# Patient Record
Sex: FEMALE | Race: WHITE | NOT HISPANIC OR LATINO | Employment: OTHER | URBAN - METROPOLITAN AREA
[De-identification: names, ages, dates, MRNs, and addresses within clinical notes are randomized per-mention and may not be internally consistent; named-entity substitution may affect disease eponyms.]

---

## 2019-06-24 ENCOUNTER — TRANSFERRED RECORDS (OUTPATIENT)
Dept: HEALTH INFORMATION MANAGEMENT | Facility: CLINIC | Age: 59
End: 2019-06-24

## 2021-10-01 ENCOUNTER — OFFICE VISIT (OUTPATIENT)
Dept: FAMILY MEDICINE | Facility: OTHER | Age: 61
End: 2021-10-01
Payer: COMMERCIAL

## 2021-10-01 VITALS
SYSTOLIC BLOOD PRESSURE: 124 MMHG | BODY MASS INDEX: 33.56 KG/M2 | HEART RATE: 77 BPM | RESPIRATION RATE: 16 BRPM | WEIGHT: 208.8 LBS | OXYGEN SATURATION: 97 % | TEMPERATURE: 97.6 F | HEIGHT: 66 IN | DIASTOLIC BLOOD PRESSURE: 74 MMHG

## 2021-10-01 DIAGNOSIS — Z98.890 HISTORY OF BACK SURGERY: ICD-10-CM
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
## 2021-10-01 DIAGNOSIS — E66.811 CLASS 1 OBESITY DUE TO EXCESS CALORIES WITHOUT SERIOUS COMORBIDITY WITH BODY MASS INDEX (BMI) OF 33.0 TO 33.9 IN ADULT: ICD-10-CM

## 2021-10-01 DIAGNOSIS — Z00.00 PHYSICAL EXAM, ANNUAL: Primary | ICD-10-CM

## 2021-10-01 DIAGNOSIS — Z00.00 ENCOUNTER FOR MEDICAL EXAMINATION TO ESTABLISH CARE: ICD-10-CM

## 2021-10-01 DIAGNOSIS — K58.1 IRRITABLE BOWEL SYNDROME WITH CONSTIPATION: ICD-10-CM

## 2021-10-01 DIAGNOSIS — E66.09 CLASS 1 OBESITY DUE TO EXCESS CALORIES WITHOUT SERIOUS COMORBIDITY WITH BODY MASS INDEX (BMI) OF 33.0 TO 33.9 IN ADULT: ICD-10-CM

## 2021-10-01 DIAGNOSIS — Z85.3 HX: BREAST CANCER: ICD-10-CM

## 2021-10-01 DIAGNOSIS — N89.8 VAGINAL DRYNESS: ICD-10-CM

## 2021-10-01 DIAGNOSIS — Z23 NEED FOR PROPHYLACTIC VACCINATION AND INOCULATION AGAINST INFLUENZA: ICD-10-CM

## 2021-10-01 DIAGNOSIS — R73.09 ELEVATED GLUCOSE: ICD-10-CM

## 2021-10-01 LAB
ALBUMIN SERPL-MCNC: 4.2 G/DL (ref 3.5–5.7)
ALP SERPL-CCNC: 81 U/L (ref 34–104)
ALT SERPL W P-5'-P-CCNC: 19 U/L (ref 7–52)
ANION GAP SERPL CALCULATED.3IONS-SCNC: 6 MMOL/L (ref 3–14)
AST SERPL W P-5'-P-CCNC: 13 U/L (ref 13–39)
BILIRUB SERPL-MCNC: 0.3 MG/DL (ref 0.3–1)
BUN SERPL-MCNC: 21 MG/DL (ref 7–25)
CALCIUM SERPL-MCNC: 9.4 MG/DL (ref 8.6–10.3)
CANCER AG125 SERPL-ACNC: 16 U/ML (ref 0–35)
CHLORIDE BLD-SCNC: 102 MMOL/L (ref 98–107)
CHOLEST SERPL-MCNC: 210 MG/DL
CO2 SERPL-SCNC: 28 MMOL/L (ref 21–31)
CREAT SERPL-MCNC: 1.06 MG/DL (ref 0.6–1.2)
ERYTHROCYTE [DISTWIDTH] IN BLOOD BY AUTOMATED COUNT: 13.1 % (ref 10–15)
FASTING STATUS PATIENT QL REPORTED: ABNORMAL
GFR SERPL CREATININE-BSD FRML MDRD: 57 ML/MIN/1.73M2
GLUCOSE BLD-MCNC: 144 MG/DL (ref 70–105)
HCT VFR BLD AUTO: 40.1 % (ref 35–47)
HDLC SERPL-MCNC: 43 MG/DL (ref 23–92)
HGB BLD-MCNC: 12.9 G/DL (ref 11.7–15.7)
LDLC SERPL CALC-MCNC: 131 MG/DL
MCH RBC QN AUTO: 28.2 PG (ref 26.5–33)
MCHC RBC AUTO-ENTMCNC: 32.2 G/DL (ref 31.5–36.5)
MCV RBC AUTO: 88 FL (ref 78–100)
NONHDLC SERPL-MCNC: 167 MG/DL
PLATELET # BLD AUTO: 231 10E3/UL (ref 150–450)
POTASSIUM BLD-SCNC: 3.7 MMOL/L (ref 3.5–5.1)
PROT SERPL-MCNC: 6.4 G/DL (ref 6.4–8.9)
RBC # BLD AUTO: 4.58 10E6/UL (ref 3.8–5.2)
SODIUM SERPL-SCNC: 136 MMOL/L (ref 134–144)
TRIGL SERPL-MCNC: 178 MG/DL
TSH SERPL DL<=0.005 MIU/L-ACNC: 3.02 MU/L (ref 0.4–4)
WBC # BLD AUTO: 6.2 10E3/UL (ref 4–11)

## 2021-10-01 PROCEDURE — 90471 IMMUNIZATION ADMIN: CPT | Performed by: FAMILY MEDICINE

## 2021-10-01 PROCEDURE — 90682 RIV4 VACC RECOMBINANT DNA IM: CPT | Performed by: FAMILY MEDICINE

## 2021-10-01 PROCEDURE — 99202 OFFICE O/P NEW SF 15 MIN: CPT | Mod: 25 | Performed by: FAMILY MEDICINE

## 2021-10-01 PROCEDURE — 36415 COLL VENOUS BLD VENIPUNCTURE: CPT | Mod: ZL | Performed by: FAMILY MEDICINE

## 2021-10-01 PROCEDURE — 80053 COMPREHEN METABOLIC PANEL: CPT | Mod: ZL | Performed by: FAMILY MEDICINE

## 2021-10-01 PROCEDURE — 86618 LYME DISEASE ANTIBODY: CPT | Mod: ZL | Performed by: FAMILY MEDICINE

## 2021-10-01 PROCEDURE — 80061 LIPID PANEL: CPT | Mod: ZL | Performed by: FAMILY MEDICINE

## 2021-10-01 PROCEDURE — 99386 PREV VISIT NEW AGE 40-64: CPT | Mod: 25 | Performed by: FAMILY MEDICINE

## 2021-10-01 PROCEDURE — 86753 PROTOZOA ANTIBODY NOS: CPT | Mod: ZL | Performed by: FAMILY MEDICINE

## 2021-10-01 PROCEDURE — 86666 EHRLICHIA ANTIBODY: CPT | Mod: ZL | Performed by: FAMILY MEDICINE

## 2021-10-01 PROCEDURE — 86300 IMMUNOASSAY TUMOR CA 15-3: CPT | Mod: ZL | Performed by: FAMILY MEDICINE

## 2021-10-01 PROCEDURE — 86304 IMMUNOASSAY TUMOR CA 125: CPT | Mod: ZL | Performed by: FAMILY MEDICINE

## 2021-10-01 PROCEDURE — 85027 COMPLETE CBC AUTOMATED: CPT | Mod: ZL | Performed by: FAMILY MEDICINE

## 2021-10-01 PROCEDURE — 84443 ASSAY THYROID STIM HORMONE: CPT | Mod: ZL | Performed by: FAMILY MEDICINE

## 2021-10-01 RX ORDER — AMITRIPTYLINE HYDROCHLORIDE 10 MG/1
TABLET ORAL
Qty: 30 TABLET | Refills: 1 | Status: SHIPPED | OUTPATIENT
Start: 2021-10-01 | End: 2023-04-26

## 2021-10-01 ASSESSMENT — MIFFLIN-ST. JEOR: SCORE: 1524.89

## 2021-10-01 ASSESSMENT — PATIENT HEALTH QUESTIONNAIRE - PHQ9: SUM OF ALL RESPONSES TO PHQ QUESTIONS 1-9: 7

## 2021-10-01 ASSESSMENT — PAIN SCALES - GENERAL: PAINLEVEL: NO PAIN (0)

## 2021-10-01 NOTE — NURSING NOTE
"Chief Complaint   Patient presents with     Physical     Establish Care     Patient is here to establish care and annual physical     Initial /74 (BP Location: Right arm, Patient Position: Sitting, Cuff Size: Adult Large)   Pulse 77   Temp 97.6  F (36.4  C) (Tympanic)   Resp 16   Ht 1.67 m (5' 5.75\")   Wt 94.7 kg (208 lb 12.8 oz)   LMP 08/31/2000   SpO2 97%   Breastfeeding No   BMI 33.96 kg/m   Estimated body mass index is 33.96 kg/m  as calculated from the following:    Height as of this encounter: 1.67 m (5' 5.75\").    Weight as of this encounter: 94.7 kg (208 lb 12.8 oz).  Medication Reconciliation: complete    Sparkle Moreira MA     FOOD SECURITY SCREENING QUESTIONS  Hunger Vital Signs:  Within the past 12 months we worried whether our food would run out before we got money to buy more. Never  Within the past 12 months the food we bought just didn't last and we didn't have money to get more. Never     Sparkle Moreira MA 10/1/2021 1:54 PM    Immunization Documentation    Prior to Immunization administration, verified patients identity using patient's name and date of birth. Please see IMMUNIZATIONS  and order for additional information.  Patient / Parent instructed to remain in clinic for 15 minutes and report any adverse reaction to staff immediately.    Was the entire amount of vaccines given used? Yes    Sparkle Moreira MA  10/1/2021   2:58 PM      "

## 2021-10-01 NOTE — PATIENT INSTRUCTIONS
Get a new, cool, [employer] name.      1.  Metamucil is ok to use every day  2.  Prescription for amitriptyline  3. Prescription for estrogen cream is sent    4.  Labs today
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
## 2021-10-01 NOTE — LETTER
October 2, 2021      Asha Ardon  [address]   [place] [address]        Dear ,    We are writing to inform you of your test results.    These are some of your initial labs to come back.  Some of your cancer markers and tick panel labs are not back at the point of this letter being sent.    Your platelet count is normal.  Thyroid testing is normal.  Blood sugar is elevated - I'm waiting on a test called A1C that will give me an idea of what your blood sugars have been averaging for the past 3 months.    Based on several factors, your current 10 years risk of developing heart disease is:    The 10-year ASCVD risk score (Mimi LAW Jr., et al., 2013) is: 4.2%    Values used to calculate the score:      Age: 61 years      Sex: Female      Is Non- : No      Diabetic: No      Tobacco smoker: No      Systolic Blood Pressure: 124 mmHg      Is BP treated: No      HDL Cholesterol: 43 mg/dL      Total Cholesterol: 210 mg/dL    If your labs show that you have diabetes. That nearly doubles your risk.      Resulted Orders   CBC W PLT No Diff   Result Value Ref Range    WBC Count 6.2 4.0 - 11.0 10e3/uL    RBC Count 4.58 3.80 - 5.20 10e6/uL    Hemoglobin 12.9 11.7 - 15.7 g/dL    Hematocrit 40.1 35.0 - 47.0 %    MCV 88 78 - 100 fL    MCH 28.2 26.5 - 33.0 pg    MCHC 32.2 31.5 - 36.5 g/dL    RDW 13.1 10.0 - 15.0 %    Platelet Count 231 150 - 450 10e3/uL      Result Value Ref Range     16 0 - 35 U/mL   TSH   Result Value Ref Range    TSH 3.02 0.40 - 4.00 mU/L   Comprehensive Metabolic Panel   Result Value Ref Range    Sodium 136 134 - 144 mmol/L    Potassium 3.7 3.5 - 5.1 mmol/L    Chloride 102 98 - 107 mmol/L    Carbon Dioxide (CO2) 28 21 - 31 mmol/L    Anion Gap 6 3 - 14 mmol/L    Urea Nitrogen 21 7 - 25 mg/dL    Creatinine 1.06 0.60 - 1.20 mg/dL    Calcium 9.4 8.6 - 10.3 mg/dL    Glucose 144 (H) 70 - 105 mg/dL    Alkaline Phosphatase 81 34 - 104 U/L    AST 13 13 - 39 U/L    ALT 19 7 - 52 U/L     Protein Total 6.4 6.4 - 8.9 g/dL    Albumin 4.2 3.5 - 5.7 g/dL    Bilirubin Total 0.3 0.3 - 1.0 mg/dL    GFR Estimate 57 (L) >60 mL/min/1.73m2      Comment:      As of July 11, 2021, eGFR is calculated by the CKD-EPI creatinine equation, without race adjustment. eGFR can be influenced by muscle mass, exercise, and diet. The reported eGFR is an estimation only and is only applicable if the renal function is stable.   Lipid Panel   Result Value Ref Range    Cholesterol 210 (H) <200 mg/dL    Triglycerides 178 (H) <150 mg/dL    Direct Measure HDL 43 23 - 92 mg/dL    LDL Cholesterol Calculated 131 (H) <=100 mg/dL    Non HDL Cholesterol 167 (H) <130 mg/dL    Patient Fasting > 8hrs? Unknown     Narrative    Cholesterol  Desirable:  <200 mg/dL    Triglycerides  Normal:  Less than 150 mg/dL  Borderline High:  150-199 mg/dL  High:  200-499 mg/dL  Very High:  Greater than or equal to 500 mg/dL    Direct Measure HDL  Female:  Greater than or equal to 50 mg/dL   Male:  Greater than or equal to 40 mg/dL    LDL Cholesterol  Desirable:  <100mg/dL  Above Desirable:  100-129 mg/dL   Borderline High:  130-159 mg/dL   High:  160-189 mg/dL   Very High:  >= 190 mg/dL    Non HDL Cholesterol  Desirable:  130 mg/dL  Above Desirable:  130-159 mg/dL  Borderline High:  160-189 mg/dL  High:  190-219 mg/dL  Very High:  Greater than or equal to 220 mg/dL       If you have any questions or concerns, please call the clinic at the number listed above.       Sincerely,      Bita Dickey MD

## 2021-10-02 LAB — CANCER AG27-29 SERPL-ACNC: 25 U/ML (ref 0–39)

## 2021-10-03 PROBLEM — K58.1 IRRITABLE BOWEL SYNDROME WITH CONSTIPATION: Status: ACTIVE | Noted: 2021-10-03

## 2021-10-03 PROBLEM — Z98.890 HISTORY OF BACK SURGERY: Status: ACTIVE | Noted: 2021-10-03

## 2021-10-03 PROBLEM — Z85.3 HX: BREAST CANCER: Status: ACTIVE | Noted: 2021-10-03

## 2021-10-04 LAB
B BURGDOR IGG+IGM SER QL: 0.1
B MICROTI IGM TITR SER: NORMAL {TITER}

## 2021-10-05 LAB — A PHAGOCYTOPH IGM TITR SER IF: NORMAL {TITER}

## 2022-06-01 ENCOUNTER — TELEPHONE (OUTPATIENT)
Dept: FAMILY MEDICINE | Facility: OTHER | Age: 62
End: 2022-06-01
Payer: COMMERCIAL

## 2022-06-01 ENCOUNTER — OFFICE VISIT (OUTPATIENT)
Dept: INTERNAL MEDICINE | Facility: OTHER | Age: 62
End: 2022-06-01
Attending: PHYSICIAN ASSISTANT
Payer: COMMERCIAL

## 2022-06-01 VITALS
TEMPERATURE: 98 F | HEIGHT: 66 IN | BODY MASS INDEX: 32.67 KG/M2 | HEART RATE: 71 BPM | WEIGHT: 203.3 LBS | OXYGEN SATURATION: 98 % | DIASTOLIC BLOOD PRESSURE: 74 MMHG | RESPIRATION RATE: 20 BRPM | SYSTOLIC BLOOD PRESSURE: 132 MMHG

## 2022-06-01 DIAGNOSIS — Z91.89 AT HIGH RISK FOR TICK BORNE ILLNESS: ICD-10-CM
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
## 2022-06-01 DIAGNOSIS — S20.461A: Primary | ICD-10-CM

## 2022-06-01 PROCEDURE — 99214 OFFICE O/P EST MOD 30 MIN: CPT | Performed by: INTERNAL MEDICINE

## 2022-06-01 RX ORDER — MULTIPLE VITAMINS W/ MINERALS TAB 9MG-400MCG
1 TAB ORAL
COMMUNITY
End: 2023-04-30

## 2022-06-01 RX ORDER — DOXYCYCLINE 100 MG/1
200 CAPSULE ORAL ONCE
Qty: 40 CAPSULE | Refills: 0 | Status: SHIPPED | OUTPATIENT
Start: 2022-06-01 | End: 2022-06-01

## 2022-06-01 ASSESSMENT — ENCOUNTER SYMPTOMS
CHILLS: 0
SHORTNESS OF BREATH: 0
FEVER: 0
BRUISES/BLEEDS EASILY: 0

## 2022-06-01 NOTE — TELEPHONE ENCOUNTER
The patient had a deer tick on her back and cannot get the head out.  Wondering what to do.   Please advise.

## 2022-06-01 NOTE — PATIENT INSTRUCTIONS
1. Tick bite of right upper back excluding scapular region, initial encounter  2. At high risk for tick borne illness    START:   - doxycycline hyclate (VIBRAMYCIN) 100 MG capsule;     Take 2 capsules (200 mg) by mouth once for 1 dose       -- Take additional 2 capsule x1 dose, after removal of embedded deer tick as needed      Dispense: 40 capsule; Refill: 0      Return as needed for follow-up for new / worsening symptoms.    Clinic : [phone]  Appointment line: [phone]

## 2022-06-01 NOTE — NURSING NOTE
"No chief complaint on file.        Initial /74 (BP Location: Right arm, Patient Position: Sitting, Cuff Size: Adult Large)   Pulse 71   Temp 98  F (36.7  C) (Temporal)   Resp 20   Ht 1.67 m (5' 5.75\")   Wt 92.2 kg (203 lb 4.8 oz)   LMP 08/31/2000   SpO2 98%   Breastfeeding No   BMI 33.06 kg/m   Estimated body mass index is 33.06 kg/m  as calculated from the following:    Height as of this encounter: 1.67 m (5' 5.75\").    Weight as of this encounter: 92.2 kg (203 lb 4.8 oz).       FOOD SECURITY SCREENING QUESTIONS:    The next two questions are to help us understand your food security.  If you are feeling you need any assistance in this area, we have resources available to support you today.    Hunger Vital Signs:  Within the past 12 months we worried whether our food would run out before we got money to buy more. Never  Within the past 12 months the food we bought just didn't last and we didn't have money to get more. Never    Advance Care Directive on file? no      Medication reconciliation complete.      Farooq Pearce,on 6/1/2022 at 3:30 PM          "

## 2022-06-01 NOTE — PROGRESS NOTES
Assessment & Plan       ICD-10-CM    1. Tick bite of right upper back excluding scapular region, initial encounter  S20.461A doxycycline hyclate (VIBRAMYCIN) 100 MG capsule    W57.XXXA    2. At high risk for tick borne illness  Z91.89 doxycycline hyclate (VIBRAMYCIN) 100 MG capsule     Patient presents for evaluation.  Patient had a tick bite on her right upper back that was started for 24+ hours.  Small deer tick was removed.  States that the tick was embedded.  Part of the tick still remains in skin.  No signs of cellulitis.  She just had the tick removed and was concerned and came in for evaluation.  She was not certain what to do for treatment options.  Has had numerous ticks on her as well as her animals recently.  Has been a bad year so far.    Treatment options reviewed discussed.  Recommend doxycycline 2 capsules x 1 dose now.  Additional capsules of the pharmacy to be used as needed after removal of an additional due to any additional embedded deer tick in the future.    Return for Return as needed for new or worsening symptoms, Appointments: [phone].    Domenic Villarreal MD  [facility] AND [facility]    Halley Barry is a 61 year old who presents for the following health issues Tick Bite    History of Present Illness       Reason for visit:  Tick bite  Symptom onset:  1-3 days ago  Symptoms include:  Inbedded tick head. Soreness at tick bite site.  Symptom intensity:  Mild  Symptom progression:  Staying the same  Had these symptoms before:  No  What makes it worse:  No  What makes it better:  No    She eats 2-3 servings of fruits and vegetables daily.She consumes 1 sweetened beverage(s) daily.She exercises with enough effort to increase her heart rate 10 to 19 minutes per day.  She exercises with enough effort to increase her heart rate 5 days per week.   She is taking medications regularly.     Review of Systems   Constitutional: Negative for chills and fever.   Respiratory: Negative for  "shortness of breath.    Cardiovascular: Negative for chest pain.   Skin:        Tick bite on right upper back   Hematological: Does not bruise/bleed easily.          Objective    /74 (BP Location: Right arm, Patient Position: Sitting, Cuff Size: Adult Large)   Pulse 71   Temp 98  F (36.7  C) (Temporal)   Resp 20   Ht 1.67 m (5' 5.75\")   Wt 92.2 kg (203 lb 4.8 oz)   LMP 08/31/2000   SpO2 98%   Breastfeeding No   BMI 33.06 kg/m    Body mass index is 33.06 kg/m .  Physical Exam  Eyes:      General: No scleral icterus.     Conjunctiva/sclera: Conjunctivae normal.   Cardiovascular:      Rate and Rhythm: Normal rate.   Pulmonary:      Effort: Pulmonary effort is normal.   Skin:     Findings: Lesion present. No rash.      Comments: Tick bite with small reddened area on right upper back   Neurological:      Mental Status: She is alert. Mental status is at baseline.                "

## 2022-06-01 NOTE — TELEPHONE ENCOUNTER
Patient call returned, verification of name and . Advised patient to seek rapid clinic today for evaluation given status of tick may be still embedded in skin, no main clinic appointment available this week. Patient verbalized understanding, has no current symptoms of concern. Valerie Conner RN ....................  2022   11:44 AM

## 2022-08-15 ENCOUNTER — VIRTUAL VISIT (OUTPATIENT)
Dept: FAMILY MEDICINE | Facility: OTHER | Age: 62
End: 2022-08-15
Attending: FAMILY MEDICINE
Payer: COMMERCIAL

## 2022-08-15 DIAGNOSIS — U07.1 INFECTION DUE TO 2019 NOVEL CORONAVIRUS: Primary | ICD-10-CM

## 2022-08-15 PROCEDURE — 99212 OFFICE O/P EST SF 10 MIN: CPT | Mod: 95 | Performed by: PHYSICIAN ASSISTANT

## 2022-08-15 ASSESSMENT — PAIN SCALES - GENERAL: PAINLEVEL: MILD PAIN (3)

## 2022-08-15 NOTE — NURSING NOTE
Pt presents to clinic today for antivirals. Patient tested positive today 8/15/22, and had symptoms 8/14/22      FOOD SECURITY SCREENING QUESTIONS:    The next two questions are to help us understand your food security.  If you are feeling you need any assistance in this area, we have resources available to support you today.    Hunger Vital Signs:  Within the past 12 months we worried whether our food would run out before we got money to buy more. Never  Within the past 12 months the food we bought just didn't last and we didn't have money to get more. Never          Medication Reconciliation: complete  Madelyn Perez LPN,LPN on 8/15/2022 at 1:40 PM

## 2022-08-15 NOTE — PATIENT INSTRUCTIONS

## 2022-08-15 NOTE — PROGRESS NOTES
"Asha is a 62 year old who is being evaluated via a billable telephone visit.      What phone number would you like to be contacted at? [phone]  How would you like to obtain your AVS? Mail a copy    Assessment & Plan   Problem List Items Addressed This Visit    None     Visit Diagnoses     Infection due to 2019 novel coronavirus    -  Primary    Relevant Medications    nirmatrelvir and ritonavir (PAXLOVID) therapy pack         Patient was given Paxlovid for treatment of COVID-19.  Gave patient education.  Encouraged increased fluids with electrolytes and rest.  Can use over-the-counter cough and cold remedies as needed for symptomatic relief.  Gave warning signs and symptoms.     BMI:   Estimated body mass index is 33.06 kg/m  as calculated from the following:    Height as of 6/1/22: 1.67 m (5' 5.75\").    Weight as of 6/1/22: 92.2 kg (203 lb 4.8 oz).       See Patient Instructions    Return if symptoms worsen or fail to improve.    Sandra Mendoza PA-C  [facility]    Subjective   Asha is a 62 year old, presenting for the following health issues:  Covid Concern (antiviral)      HPI       COVID-19 Symptom Review  How many days ago did these symptoms start? Yesterday     Are any of the following symptoms significant for you?    New or worsening difficulty breathing? No    Worsening cough? Yes, I am coughing up mucus.    Fever or chills? Yes, I felt feverish or had chills.    Headache: YES    Sore throat: YES- yesterday     Chest pain: No    Diarrhea: No    Body aches? YES    What treatments has patient tried? Ibuprofen    Does patient live in a nursing home, group home, or shelter? No  Does patient have a way to get food/medications during quarantined? Yes, I have a friend or family member who can help me.    Patient woke up yesterday with a sore throat and dry cough.  Started feeling lethargic.  Having sweats and cold feelings.  Feels hot and cold.  Having sweats, body aches, and muscle " cramps.  Unsure if she is having a fever as her thermometer may not be working.  Having some cough and decreased sense of taste and smell.  Feels stuffed up.  Positive for COVID-19 this morning by testing.  Slight headache.  Using ibuprofen as needed.  Keeping food and fluids down.  No dehydration concerns.  No shortness of breath or wheezing.      Review of Systems   Constitutional, HEENT, cardiovascular, pulmonary, gi and gu systems are negative, except as otherwise noted.      Objective    Vitals - Patient Reported  Pain Score: Mild Pain (3)  Pain Loc:  (body aches)      Vitals:  No vitals were obtained today due to virtual visit.    Physical Exam   healthy, alert and no distress  PSYCH: Alert and oriented times 3; coherent speech, normal   rate and volume, able to articulate logical thoughts, able   to abstract reason, no tangential thoughts, no hallucinations   or delusions  Her affect is normal  RESP: No cough, no audible wheezing, able to talk in full sentences  Remainder of exam unable to be completed due to telephone visits                Phone call duration: 9 minutes    .  ..

## 2022-11-19 ENCOUNTER — HEALTH MAINTENANCE LETTER (OUTPATIENT)
Age: 62
End: 2022-11-19

## 2022-12-29 ENCOUNTER — IMMUNIZATION (OUTPATIENT)
Dept: FAMILY MEDICINE | Facility: OTHER | Age: 62
End: 2022-12-29
Attending: FAMILY MEDICINE
Payer: COMMERCIAL

## 2022-12-29 DIAGNOSIS — Z23 NEED FOR PROPHYLACTIC VACCINATION AND INOCULATION AGAINST INFLUENZA: Primary | ICD-10-CM

## 2022-12-29 DIAGNOSIS — Z23 HIGH PRIORITY FOR 2019-NCOV VACCINE: ICD-10-CM

## 2022-12-29 PROCEDURE — 90471 IMMUNIZATION ADMIN: CPT

## 2022-12-29 PROCEDURE — 0124A COVID-19 VACCINE BIVALENT BOOSTER 12+ (PFIZER): CPT

## 2022-12-29 PROCEDURE — 90682 RIV4 VACC RECOMBINANT DNA IM: CPT

## 2022-12-29 PROCEDURE — 91312 COVID-19 VACCINE BIVALENT BOOSTER 12+ (PFIZER): CPT

## 2023-02-07 ENCOUNTER — TRANSFERRED RECORDS (OUTPATIENT)
Dept: HEALTH INFORMATION MANAGEMENT | Facility: OTHER | Age: 63
End: 2023-02-07
Payer: COMMERCIAL

## 2023-02-13 NOTE — PROGRESS NOTES
Assessment & Plan     1. Closed fracture of multiple ribs of right side with routine healing, subsequent encounter  2. Concussion with loss of consciousness of 30 minutes or less, subsequent encounter  3. Traumatic pneumothorax, subsequent encounter  Reviewed outside records, will request to push imaging for further review.  Patient is healing well.  Updated chest x-ray which was stable today.  Continue with symptomatic management and follow-up as needed.    4. Mild coronary artery disease  Noted on CT chest completed in ED during trauma evaluation.  Discussed with patient that calcification was noted to be mild in discharge summary, unable to review radiology report or imaging.  Discussed updating lab work as below to help minimize risk factors.  Results as discussed below. Currently no cardiac symptoms. Will continue to minimize risk factors as discussed below, focus on healthy lifestyle.   - Hemoglobin A1c; Future  - Lipid Panel; Future  - Basic Metabolic Panel; Future  - Hemoglobin A1c  - Lipid Panel  - Basic Metabolic Panel    5. Type 2 diabetes mellitus without complication, without long-term current use of insulin (H)  New diagnosis with A1c at 7.6 today.  Recommend patient schedule follow-up appoint with myself or PCP to discuss treatment options and get started on medication in addition to healthy lifestyle.    6. Mixed hyperlipidemia  Level stable compared to previous, will likely need to start statin due to new diagnosis of type 2 diabetes.  Can discuss this at follow-up appointment with myself or PCP.    7. Class 1 obesity without serious comorbidity with body mass index (BMI) of 33.0 to 33.9 in adult, unspecified obesity type  Patient continues to struggle with weight.  Has tried different diets and exercise with minimal improvement.  She would be interested in meeting with medical weight management team.  Referral placed.  - Comprehensive Weight Management; Future        Return if symptoms worsen or  fail to improve, for Follow up with myself or PCP to discuss new diagnosis of Type II Diabetes.    Beth Flores PA-C  [facility]    Subjective   Asha is a 62 year old, presenting for the following health issues:  Hospital F/U      History of Present Illness       Reason for visit:  Cracked ribs and review CT scan of my heart  Symptom onset:  1-2 weeks ago  Symptoms include:  Pain in my lower right side rib cage.  Symptom intensity:  Moderate  Symptom progression:  Improving  Had these symptoms before:  No  What makes it worse:  Coughing, reaching across body, laying down  What makes it better:  I feel good except for the above mentioned motions    She eats 2-3 servings of fruits and vegetables daily.She consumes 2 sweetened beverage(s) daily.She exercises with enough effort to increase her heart rate 10 to 19 minutes per day.  She exercises with enough effort to increase her heart rate 7 days per week.   She is taking medications regularly.       Hospital Follow-up Visit:    Hospital/Nursing Home/IP Rehab Facility: [facility]  Date of Admission: 02/04/2023  Date of Discharge: 02/07/2023  Reason(s) for Admission: Dog sledding accident- concussion, right pneumothorax, right 6-9 rib fractures, mild coronary artery disease    Was your hospitalization related to COVID-19? No   Problems taking medications regularly:  None  Medication changes since discharge: None  Problems adhering to non-medication therapy:  None    Summary of hospitalization:  See outside records, reviewed and scanned  Diagnostic Tests/Treatments reviewed.  Follow up needed: none  Other Healthcare Providers Involved in Patient s Care:         None  Update since discharge: improved.     Plan of care communicated with patient     Reviewed fax discharge summary showing patient was evaluated for trauma secondary to dog sledding accident.  Diagnosed with concussion with brief loss of consciousness, right pneumothorax initially  seen on CT, resolved by following morning, right 6-9 rib fractures noted on CT, minimal evidence on follow-up x-rays.  Patient improved well throughout her hospital stay.  Incidental finding of mild coronary artery calcification especially in the LAD on CT during trauma evaluation.  Patient has previously been noted to have mild high cholesterol, borderline prediabetes.  Has not had lab work for follow-up of either since 10/2021.  Underwent chest x-ray ordered by discharging provider prior to today's visit which was stable.  Overall patient is feeling quite well.  Still struggling with some posterior right rib pain but managing well symptomatically.    Patient would like to focus on healthy lifestyle, weight loss.  She has tried with several different diets, exercise with minimal improvement.  BMI is currently 33.47.  Due for follow-up lab work.      PAST MEDICAL HISTORY:   Past Medical History:   Diagnosis Date     Back pain 1992    X5 total 1994, 1998. 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       PAST SURGICAL HISTORY:   Past Surgical History:   Procedure Laterality Date     ANESTH,SKIN SURGERY, BACK  1992, 1994, 1998,2004     BREAST BIOPSY, RT/LT  11/10/1998    left breast biopsy     COLONOSCOPY  08/15/2019     HYSTERECTOMY, IMTIAZ  01/01/2000    with BSO     MASTECTOMY, BILATERAL  11/19/1998     ZZC ANESTH,SURG BREAST RECONSTRUCTIVE Bilateral 2004       FAMILY HISTORY:   Family History   Problem Relation Age of Onset     Valvular heart disease Mother         2018     Heart Disease Father      Dementia Father         vascular     Breast Cancer Sister 29     Breast Cancer Sister         DCIS     No Known Problems Brother      No Known Problems Brother        SOCIAL HISTORY:   Social History     Tobacco Use     Smoking status: Never     Smokeless tobacco: Never   Substance Use Topics     Alcohol use: Yes  "    Comment: socially - couple drinks every 3 months        Allergies   Allergen Reactions     Ancef [Cefazolin Sodium] Rash     Biaxin [Clarithromycin] Rash     Contrast Dye Rash     Current Outpatient Medications   Medication     amitriptyline (ELAVIL) 10 MG tablet     multivitamin w/minerals (THERA-VIT-M) tablet     conjugated estrogens (PREMARIN) 0.625 MG/GM vaginal cream     No current facility-administered medications for this visit.         Review of Systems   Per HPI        Objective    /76   Pulse 78   Temp (!) 96.3  F (35.7  C)   Resp 14   Ht 1.67 m (5' 5.75\")   Wt 93.4 kg (205 lb 12.8 oz)   LMP 08/31/2000   SpO2 98%   BMI 33.47 kg/m    Body mass index is 33.47 kg/m .  Physical Exam   General: Pleasant, in no apparent distress.  Cardiovascular: Regular rate and rhythm with S1 equal to S2. No murmurs, friction rubs, or gallops.   Respiratory: Lungs are resonant and clear to auscultation bilaterally. No wheezes, crackles, or rhonchi.  Musculoskeletal: Tenderness over posterior mid ribs on right  Psych: Appropriate mood and affect.    Results for orders placed or performed during the hospital encounter of 02/14/23   XR Chest 2 Views     Status: None    Narrative    Procedure:XR CHEST 2 VIEWS    Clinical history:Female, 62 years, Closed fracture of multiple ribs of  right side, initial encounter    Technique: Two views are submitted.    Comparison: No relevant prior imaging.    Findings: The cardiac silhouette is normal. The pulmonary vasculature  is normal.    The lungs are hyperinflated and demonstrate areas of atelectasis in  the lung bases as well as within the right middle lobe. Bony  structures demonstrate no acute abnormality. Postoperative changes are  seen in the cervicothoracic regions. Degenerative changes seen within  the left and right AC joint.      Impression    Impression:   No distinct evidence of rib fracture or pneumothorax.    Hyperinflation of the lungs with areas of " atelectasis in the lung  bases and right middle lobe.     NATHANAEL JORDAN MD         SYSTEM ID:  [id number]   Results for orders placed or performed in visit on 02/14/23   Hemoglobin A1c     Status: Abnormal   Result Value Ref Range    Hemoglobin A1C 7.6 (H) 4.0 - 6.2 %   Lipid Panel     Status: Abnormal   Result Value Ref Range    Cholesterol 207 (H) <200 mg/dL    Triglycerides 133 <150 mg/dL    Direct Measure HDL 50 >=50 mg/dL    LDL Cholesterol Calculated 130 (H) <=100 mg/dL    Non HDL Cholesterol 157 (H) <130 mg/dL    Narrative    Cholesterol  Desirable:  <200 mg/dL    Triglycerides  Normal:  Less than 150 mg/dL  Borderline High:  150-199 mg/dL  High:  200-499 mg/dL  Very High:  Greater than or equal to 500 mg/dL    Direct Measure HDL  Female:  Greater than or equal to 50 mg/dL   Male:  Greater than or equal to 40 mg/dL    LDL Cholesterol  Desirable:  <100mg/dL  Above Desirable:  100-129 mg/dL   Borderline High:  130-159 mg/dL   High:  160-189 mg/dL   Very High:  >= 190 mg/dL    Non HDL Cholesterol  Desirable:  130 mg/dL  Above Desirable:  130-159 mg/dL  Borderline High:  160-189 mg/dL  High:  190-219 mg/dL  Very High:  Greater than or equal to 220 mg/dL   Basic Metabolic Panel     Status: Abnormal   Result Value Ref Range    Sodium 141 136 - 145 mmol/L    Potassium 4.5 3.4 - 5.3 mmol/L    Chloride 103 98 - 107 mmol/L    Carbon Dioxide (CO2) 32 (H) 22 - 29 mmol/L    Anion Gap 6 (L) 7 - 15 mmol/L    Urea Nitrogen 22.1 8.0 - 23.0 mg/dL    Creatinine 0.95 0.51 - 0.95 mg/dL    Calcium 9.0 8.8 - 10.2 mg/dL    Glucose 159 (H) 70 - 99 mg/dL    GFR Estimate 67 >60 mL/min/1.73m2   Extra Tube     Status: None    Narrative    The following orders were created for panel order Extra Tube.  Procedure                               Abnormality         Status                     ---------                               -----------         ------                     Extra Serum Separator Tu...[[id number]]                      Final  result                 Please view results for these tests on the individual orders.   Extra Serum Separator Tube (SST)     Status: None   Result Value Ref Range    Hold Specimen x

## 2023-02-14 ENCOUNTER — OFFICE VISIT (OUTPATIENT)
Dept: FAMILY MEDICINE | Facility: OTHER | Age: 63
End: 2023-02-14
Attending: PHYSICIAN ASSISTANT
Payer: COMMERCIAL

## 2023-02-14 ENCOUNTER — HOSPITAL ENCOUNTER (OUTPATIENT)
Dept: GENERAL RADIOLOGY | Facility: OTHER | Age: 63
Discharge: HOME | End: 2023-02-14
Payer: COMMERCIAL

## 2023-02-14 VITALS
SYSTOLIC BLOOD PRESSURE: 132 MMHG | HEIGHT: 66 IN | RESPIRATION RATE: 14 BRPM | WEIGHT: 205.8 LBS | HEART RATE: 78 BPM | TEMPERATURE: 96.3 F | OXYGEN SATURATION: 98 % | DIASTOLIC BLOOD PRESSURE: 76 MMHG | BODY MASS INDEX: 33.07 KG/M2

## 2023-02-14 DIAGNOSIS — S27.0XXD TRAUMATIC PNEUMOTHORAX, SUBSEQUENT ENCOUNTER: ICD-10-CM

## 2023-02-14 DIAGNOSIS — E66.811 CLASS 1 OBESITY WITHOUT SERIOUS COMORBIDITY WITH BODY MASS INDEX (BMI) OF 33.0 TO 33.9 IN ADULT, UNSPECIFIED OBESITY TYPE: ICD-10-CM

## 2023-02-14 DIAGNOSIS — S22.41XD CLOSED FRACTURE OF MULTIPLE RIBS OF RIGHT SIDE WITH ROUTINE HEALING, SUBSEQUENT ENCOUNTER: Primary | ICD-10-CM

## 2023-02-14 DIAGNOSIS — S06.0X1D CONCUSSION WITH LOSS OF CONSCIOUSNESS OF 30 MINUTES OR LESS, SUBSEQUENT ENCOUNTER: ICD-10-CM

## 2023-02-14 DIAGNOSIS — I25.10 MILD CORONARY ARTERY DISEASE: ICD-10-CM

## 2023-02-14 DIAGNOSIS — E11.9 TYPE 2 DIABETES MELLITUS WITHOUT COMPLICATION, WITHOUT LONG-TERM CURRENT USE OF INSULIN (H): ICD-10-CM

## 2023-02-14 DIAGNOSIS — E78.2 MIXED HYPERLIPIDEMIA: ICD-10-CM

## 2023-02-14 DIAGNOSIS — S22.41XA CLOSED FRACTURE OF MULTIPLE RIBS OF RIGHT SIDE, INITIAL ENCOUNTER: ICD-10-CM

## 2023-02-14 LAB
ANION GAP SERPL CALCULATED.3IONS-SCNC: 6 MMOL/L (ref 7–15)
BUN SERPL-MCNC: 22.1 MG/DL (ref 8–23)
CALCIUM SERPL-MCNC: 9 MG/DL (ref 8.8–10.2)
CHLORIDE SERPL-SCNC: 103 MMOL/L (ref 98–107)
CHOLEST SERPL-MCNC: 207 MG/DL
CREAT SERPL-MCNC: 0.95 MG/DL (ref 0.51–0.95)
DEPRECATED HCO3 PLAS-SCNC: 32 MMOL/L (ref 22–29)
GFR SERPL CREATININE-BSD FRML MDRD: 67 ML/MIN/1.73M2
GLUCOSE SERPL-MCNC: 159 MG/DL (ref 70–99)
HBA1C MFR BLD: 7.6 % (ref 4–6.2)
HDLC SERPL-MCNC: 50 MG/DL
HOLD SPECIMEN: NORMAL
LDLC SERPL CALC-MCNC: 130 MG/DL
NONHDLC SERPL-MCNC: 157 MG/DL
POTASSIUM SERPL-SCNC: 4.5 MMOL/L (ref 3.4–5.3)
SODIUM SERPL-SCNC: 141 MMOL/L (ref 136–145)
TRIGL SERPL-MCNC: 133 MG/DL

## 2023-02-14 PROCEDURE — 71046 X-RAY EXAM CHEST 2 VIEWS: CPT

## 2023-02-14 PROCEDURE — 36415 COLL VENOUS BLD VENIPUNCTURE: CPT | Mod: ZL | Performed by: PHYSICIAN ASSISTANT

## 2023-02-14 PROCEDURE — 83036 HEMOGLOBIN GLYCOSYLATED A1C: CPT | Mod: ZL | Performed by: PHYSICIAN ASSISTANT

## 2023-02-14 PROCEDURE — 99214 OFFICE O/P EST MOD 30 MIN: CPT | Performed by: PHYSICIAN ASSISTANT

## 2023-02-14 PROCEDURE — 80061 LIPID PANEL: CPT | Mod: ZL | Performed by: PHYSICIAN ASSISTANT

## 2023-02-14 PROCEDURE — 80048 BASIC METABOLIC PNL TOTAL CA: CPT | Mod: ZL | Performed by: PHYSICIAN ASSISTANT

## 2023-02-14 ASSESSMENT — PAIN SCALES - GENERAL: PAINLEVEL: MILD PAIN (3)

## 2023-02-14 NOTE — NURSING NOTE
"Patient presents to clinic for hospital follow up. She states that she is feeling \"pretty good\".  Pooja Casarez LPN ....................  2/14/2023   1:27 PM      "

## 2023-02-20 PROBLEM — J93.9 PNEUMOTHORAX: Status: ACTIVE | Noted: 2023-02-04

## 2023-02-20 PROBLEM — S22.49XA RIB FRACTURES: Status: ACTIVE | Noted: 2023-02-04

## 2023-03-09 ENCOUNTER — MYC MEDICAL ADVICE (OUTPATIENT)
Dept: FAMILY MEDICINE | Facility: OTHER | Age: 63
End: 2023-03-09
Payer: COMMERCIAL

## 2023-03-09 DIAGNOSIS — S22.49XD CLOSED FRACTURE OF MULTIPLE RIBS WITH ROUTINE HEALING, UNSPECIFIED LATERALITY, SUBSEQUENT ENCOUNTER: Primary | ICD-10-CM

## 2023-03-09 RX ORDER — OXYCODONE HYDROCHLORIDE 5 MG/1
5 TABLET ORAL EVERY 4 HOURS PRN
COMMUNITY
Start: 2023-02-07 | End: 2023-04-26

## 2023-03-09 RX ORDER — TIZANIDINE 2 MG/1
2 TABLET ORAL 3 TIMES DAILY PRN
COMMUNITY
Start: 2023-02-07 | End: 2023-03-09

## 2023-03-09 RX ORDER — TIZANIDINE 2 MG/1
2 TABLET ORAL 3 TIMES DAILY PRN
Qty: 30 TABLET | Refills: 0 | Status: SHIPPED | OUTPATIENT
Start: 2023-03-09 | End: 2023-04-30

## 2023-03-09 RX ORDER — LIDOCAINE 4 G/G
1 PATCH TOPICAL DAILY
COMMUNITY
Start: 2023-02-07 | End: 2023-04-30

## 2023-03-09 RX ORDER — AMITRIPTYLINE HYDROCHLORIDE 10 MG/1
10-20 TABLET ORAL
COMMUNITY
Start: 2021-10-01 | End: 2023-04-30

## 2023-03-09 RX ORDER — ACETAMINOPHEN 500 MG
2 TABLET ORAL EVERY 6 HOURS
COMMUNITY
Start: 2023-02-07 | End: 2023-04-30

## 2023-03-09 RX ORDER — IBUPROFEN 200 MG
400 TABLET ORAL EVERY 4 HOURS PRN
COMMUNITY
End: 2023-04-30

## 2023-03-09 RX ORDER — POLYETHYLENE GLYCOL 3350 17 G/17G
1 POWDER, FOR SOLUTION ORAL DAILY
COMMUNITY
Start: 2023-02-08 | End: 2023-04-30

## 2023-04-21 NOTE — PROGRESS NOTES
"Pre-Visit Planning   Next 5 appointments (look out 90 days)    Apr 26, 2023  2:00 PM  PHYSICAL with Bita Dickey MD  [facility] ([facility] ) [address]  [place] [id number] [phone]        Appointment Notes for this encounter:   PX  [phone]  Patient preferred phone number: [phone]    Contacted patient via phone/[employer]uk[employer]. Are there any additional questions or concerns you'd like to review with your provider during your visit? Yes: \" I am still having trouble my shoulder since my accident on 02/04/23. Maybe I need an ortho referral. I am going to call  today and see if my insurance will cover the weight loss program. I want to loss weight. I also want some blood work done for the cancer makers and what ever labs she thinks I need.  My eye exam is current. I just got new glasses. I need to make an appointment for the dentist. I want to further discuss the vaccines and screening's at the visit\".     Visit is preventive. Reviewed purpose of preventive visit with patient. Patient verbalized understanding     Meds  Refills pended    Entered patient-preferred pharmacy. and [facility] .\" I only need refill of the amitriptyline and tizanidine\".     Current Outpatient Medications   Medication     acetaminophen (TYLENOL) 500 MG tablet     amitriptyline (ELAVIL) 10 MG tablet     amitriptyline (ELAVIL) 10 MG tablet     ibuprofen (ADVIL/MOTRIN) 200 MG tablet     Lidocaine (LIDOCARE) 4 % Patch     multivitamin w/minerals (THERA-VIT-M) tablet     oxyCODONE (ROXICODONE) 5 MG tablet     polyethylene glycol (MIRALAX) 17 GM/Dose powder     tiZANidine (ZANAFLEX) 2 MG tablet     No current facility-administered medications for this visit.     [employer]  Patient is active on [facility].    Questionnaire Review   Offered information on completing questionnaires via [facility].  Advised patient to arrive early in order to complete " questionnaires.    Call Summary  Advised patient to call back at [phone] if needed.     Lab Results   Component Value Date    A1C 7.6 02/14/2023               Answers for HPI/ROS submitted by the patient on 4/26/2023  Frequency of exercise:: 2-3 days/week  Getting at least 3 servings of Calcium per day:: Yes  Diet:: Regular (no restrictions)  Taking medications regularly:: Yes  Medication side effects:: Significant flushing  Bi-annual eye exam:: Yes  Dental care twice a year:: NO  Sleep apnea or symptoms of sleep apnea:: None  abdominal pain: No  Blood in stool: No  Blood in urine: No  chest pain: No  chills: No  congestion: No  constipation: No  cough: No  diarrhea: No  dizziness: No  ear pain: No  eye pain: No  nervous/anxious: No  fever: No  frequency: No  genital sores: No  headaches: No  hearing loss: No  heartburn: No  arthralgias: No  joint swelling: No  peripheral edema: No  mood changes: No  myalgias: No  nausea: No  dysuria: No  palpitations: No  Skin sensation changes: No  sore throat: No  urgency: No  rash: No  shortness of breath: No  visual disturbance: No  weakness: No  pelvic pain: No  vaginal bleeding: No  vaginal discharge: No  tenderness: No  breast mass: No  breast discharge: No  Additional concerns today:: Yes  Duration of exercise:: 15-30 minutes

## 2023-04-25 PROBLEM — E11.9 DIABETES MELLITUS, TYPE 2 (H): Status: ACTIVE | Noted: 2023-04-25

## 2023-04-25 RX ORDER — TIZANIDINE 2 MG/1
2 TABLET ORAL 3 TIMES DAILY PRN
Qty: 30 TABLET | Refills: 0 | Status: CANCELLED | OUTPATIENT
Start: 2023-04-25

## 2023-04-26 ENCOUNTER — OFFICE VISIT (OUTPATIENT)
Dept: FAMILY MEDICINE | Facility: OTHER | Age: 63
End: 2023-04-26
Attending: FAMILY MEDICINE
Payer: COMMERCIAL

## 2023-04-26 VITALS
DIASTOLIC BLOOD PRESSURE: 74 MMHG | OXYGEN SATURATION: 98 % | SYSTOLIC BLOOD PRESSURE: 118 MMHG | RESPIRATION RATE: 14 BRPM | BODY MASS INDEX: 32.95 KG/M2 | WEIGHT: 205 LBS | HEART RATE: 69 BPM | TEMPERATURE: 97.7 F | HEIGHT: 66 IN

## 2023-04-26 DIAGNOSIS — Z98.890 HISTORY OF BACK SURGERY: ICD-10-CM

## 2023-04-26 DIAGNOSIS — M75.41 IMPINGEMENT SYNDROME OF SHOULDER REGION, RIGHT: ICD-10-CM

## 2023-04-26 DIAGNOSIS — Z85.3 HX: BREAST CANCER: ICD-10-CM

## 2023-04-26 DIAGNOSIS — E11.9 TYPE 2 DIABETES MELLITUS WITHOUT COMPLICATION, WITHOUT LONG-TERM CURRENT USE OF INSULIN (H): ICD-10-CM

## 2023-04-26 DIAGNOSIS — S22.49XD CLOSED FRACTURE OF MULTIPLE RIBS WITH ROUTINE HEALING, UNSPECIFIED LATERALITY, SUBSEQUENT ENCOUNTER: ICD-10-CM

## 2023-04-26 DIAGNOSIS — E66.811 CLASS 1 OBESITY WITHOUT SERIOUS COMORBIDITY WITH BODY MASS INDEX (BMI) OF 33.0 TO 33.9 IN ADULT, UNSPECIFIED OBESITY TYPE: ICD-10-CM

## 2023-04-26 DIAGNOSIS — Z00.00 PHYSICAL EXAM, ANNUAL: Primary | ICD-10-CM

## 2023-04-26 LAB
ALBUMIN SERPL BCG-MCNC: 4.3 G/DL (ref 3.5–5.2)
ALP SERPL-CCNC: 102 U/L (ref 35–104)
ALT SERPL W P-5'-P-CCNC: 26 U/L (ref 10–35)
ANION GAP SERPL CALCULATED.3IONS-SCNC: 10 MMOL/L (ref 7–15)
AST SERPL W P-5'-P-CCNC: 25 U/L (ref 10–35)
BILIRUB SERPL-MCNC: 0.3 MG/DL
BUN SERPL-MCNC: 20.8 MG/DL (ref 8–23)
CALCIUM SERPL-MCNC: 9.6 MG/DL (ref 8.8–10.2)
CHLORIDE SERPL-SCNC: 100 MMOL/L (ref 98–107)
CREAT SERPL-MCNC: 0.92 MG/DL (ref 0.51–0.95)
CREAT UR-MCNC: 11.7 MG/DL
DEPRECATED HCO3 PLAS-SCNC: 29 MMOL/L (ref 22–29)
GFR SERPL CREATININE-BSD FRML MDRD: 70 ML/MIN/1.73M2
GLUCOSE SERPL-MCNC: 123 MG/DL (ref 70–99)
MICROALBUMIN UR-MCNC: <12 MG/L
MICROALBUMIN/CREAT UR: NORMAL MG/G{CREAT}
POTASSIUM SERPL-SCNC: 3.9 MMOL/L (ref 3.4–5.3)
PROT SERPL-MCNC: 7.2 G/DL (ref 6.4–8.3)
SODIUM SERPL-SCNC: 139 MMOL/L (ref 136–145)

## 2023-04-26 PROCEDURE — 99396 PREV VISIT EST AGE 40-64: CPT | Performed by: FAMILY MEDICINE

## 2023-04-26 PROCEDURE — 82570 ASSAY OF URINE CREATININE: CPT | Mod: ZL | Performed by: FAMILY MEDICINE

## 2023-04-26 PROCEDURE — 86304 IMMUNOASSAY TUMOR CA 125: CPT | Mod: ZL | Performed by: FAMILY MEDICINE

## 2023-04-26 PROCEDURE — 80053 COMPREHEN METABOLIC PANEL: CPT | Mod: ZL | Performed by: FAMILY MEDICINE

## 2023-04-26 PROCEDURE — 86300 IMMUNOASSAY TUMOR CA 15-3: CPT | Mod: ZL | Performed by: FAMILY MEDICINE

## 2023-04-26 PROCEDURE — 36415 COLL VENOUS BLD VENIPUNCTURE: CPT | Mod: ZL | Performed by: FAMILY MEDICINE

## 2023-04-26 PROCEDURE — 99213 OFFICE O/P EST LOW 20 MIN: CPT | Mod: 25 | Performed by: FAMILY MEDICINE

## 2023-04-26 RX ORDER — METFORMIN HCL 500 MG
500 TABLET, EXTENDED RELEASE 24 HR ORAL
Qty: 90 TABLET | Refills: 3 | Status: SHIPPED | OUTPATIENT
Start: 2023-04-26 | End: 2024-03-11

## 2023-04-26 RX ORDER — AMITRIPTYLINE HYDROCHLORIDE 10 MG/1
TABLET ORAL
Qty: 30 TABLET | Refills: 1 | Status: SHIPPED | OUTPATIENT
Start: 2023-04-26 | End: 2024-03-11

## 2023-04-26 ASSESSMENT — ENCOUNTER SYMPTOMS
FREQUENCY: 0
DIZZINESS: 0
CONSTIPATION: 0
SHORTNESS OF BREATH: 0
HEARTBURN: 0
CHILLS: 0
JOINT SWELLING: 0
HEMATOCHEZIA: 0
DYSURIA: 0
MYALGIAS: 0
EYE PAIN: 0
BREAST MASS: 0
COUGH: 0
PARESTHESIAS: 0
NAUSEA: 0
PALPITATIONS: 0
HEMATURIA: 0
ARTHRALGIAS: 0
FEVER: 0
WEAKNESS: 0
SORE THROAT: 0
ABDOMINAL PAIN: 0
NERVOUS/ANXIOUS: 0
DIARRHEA: 0
HEADACHES: 0

## 2023-04-26 ASSESSMENT — PAIN SCALES - GENERAL: PAINLEVEL: MODERATE PAIN (4)

## 2023-04-26 NOTE — NURSING NOTE
"Chief Complaint   Patient presents with     Physical     Patient is here for annual exam     Initial /74   Pulse 69   Temp 97.7  F (36.5  C) (Tympanic)   Resp 14   Ht 1.67 m (5' 5.75\")   Wt 93 kg (205 lb)   LMP 08/31/2000   SpO2 98%   Breastfeeding No   BMI 33.34 kg/m   Estimated body mass index is 33.34 kg/m  as calculated from the following:    Height as of this encounter: 1.67 m (5' 5.75\").    Weight as of this encounter: 93 kg (205 lb).  Medication Reconciliation: complete    Sparkle Moreira CMA       FOOD SECURITY SCREENING QUESTIONS:    The next two questions are to help us understand your food security.  If you are feeling you need any assistance in this area, we have resources available to support you today.    Hunger Vital Signs:  Within the past 12 months we worried whether our food would run out before we got money to buy more. Never  Within the past 12 months the food we bought just didn't last and we didn't have money to get more. Never  Sparkle Moreira CMA,LPN on 4/26/2023 at 2:02 PM      "

## 2023-04-26 NOTE — PROGRESS NOTES
Previous A1C is at goal of <8  Lab Results   Component Value Date    A1C 7.6 02/14/2023       Foot exam DUE (today)  Eye exam Last summer at [facility]     Tobacco User No   Patient is not on a daily aspirin  Patient is not on a Statin.  Blood pressure today of:     BP Readings from Last 1 Encounters:   04/26/23 118/74      is at the goal of <139/89 for diabetics.    Sparkle Moreira CMA on 4/26/2023 at 2:05 PM      Answers for HPI/ROS submitted by the patient on 4/26/2023  Frequency of exercise:: 2-3 days/week  Getting at least 3 servings of Calcium per day:: Yes  Diet:: Regular (no restrictions)  Taking medications regularly:: Yes  Medication side effects:: Significant flushing  Bi-annual eye exam:: Yes  Dental care twice a year:: NO  Sleep apnea or symptoms of sleep apnea:: None  abdominal pain: No  Blood in stool: No  Blood in urine: No  chest pain: No  chills: No  congestion: No  constipation: No  cough: No  diarrhea: No  dizziness: No  ear pain: No  eye pain: No  nervous/anxious: No  fever: No  frequency: No  genital sores: No  headaches: No  hearing loss: No  heartburn: No  arthralgias: No  joint swelling: No  peripheral edema: No  mood changes: No  myalgias: No  nausea: No  dysuria: No  palpitations: No  Skin sensation changes: No  sore throat: No  urgency: No  rash: No  shortness of breath: No  visual disturbance: No  weakness: No  pelvic pain: No  vaginal bleeding: No  vaginal discharge: No  tenderness: No  breast mass: No  breast discharge: No  Additional concerns today:: Yes  Duration of exercise:: 15-30 minutes

## 2023-04-26 NOTE — PROGRESS NOTES
SUBJECTIVE:   CC: Asha is an 62 year old who presents for preventive health visit.       4/26/2023     1:57 PM   Additional Questions   Roomed by SOFIA Villagran   Accompanied by Self     Healthy Habits:     Getting at least 3 servings of Calcium per day:  Yes    Bi-annual eye exam:  Yes    Dental care twice a year:  NO    Sleep apnea or symptoms of sleep apnea:  None    Diet:  Regular (no restrictions)    Frequency of exercise:  2-3 days/week    Duration of exercise:  15-30 minutes    Taking medications regularly:  Yes    Medication side effects:  Significant flushing    PHQ-2 Total Score: 0    Additional concerns today:  Yes    1.  Ongoing right shoulder pain after accident this winter.  She had rib fractures then.  Shoulder clicks and makes noises.  She is right handed.  Would like to see orthopedics/sports medicine for this.    2.  Type 2 diabetes - new diagnosis  Lab Results   Component Value Date    A1C 7.6 02/14/2023     Would like to see diabetes ed and nutritionist      3.  History of breast cancer            Today's PHQ-2 Score:       4/26/2023     1:47 PM   PHQ-2 ( 1999 [employer])   Q1: Little interest or pleasure in doing things 0   Q2: Feeling down, depressed or hopeless 0   PHQ-2 Score 0   Q1: Little interest or pleasure in doing things Not at all    Not at all   Q2: Feeling down, depressed or hopeless Not at all    Not at all   PHQ-2 Score 0    0       Have you ever done Advance Care Planning? (For example, a Health Directive, POLST, or a discussion with a medical provider or your loved ones about your wishes): No, advance care planning information given to patient to review.  Patient plans to discuss their wishes with loved ones or provider.      Social History     Tobacco Use     Smoking status: Never     Smokeless tobacco: Never   Vaping Use     Vaping status: Never Used   Substance Use Topics     Alcohol use: Yes     Comment: socially - couple drinks every 3 months             4/26/2023     1:47 PM    Alcohol Use   Prescreen: >3 drinks/day or >7 drinks/week? No     Reviewed orders with patient.  Reviewed health maintenance and updated orders accordingly - Yes  BP Readings from Last 3 Encounters:   04/26/23 118/74   02/14/23 132/76   06/01/22 132/74    Wt Readings from Last 3 Encounters:   04/26/23 93 kg (205 lb)   02/14/23 93.4 kg (205 lb 12.8 oz)   06/01/22 92.2 kg (203 lb 4.8 oz)                  Current Outpatient Medications   Medication Sig Dispense Refill     amitriptyline (ELAVIL) 10 MG tablet 1-2 at bedtime for back pain 30 tablet 1     metFORMIN (GLUCOPHAGE XR) 500 MG 24 hr tablet Take 1 tablet (500 mg) by mouth daily (with dinner) 90 tablet 3     Allergies   Allergen Reactions     Ancef [Cefazolin Sodium] Rash     Biaxin [Clarithromycin] Rash     Contrast Dye Rash     Iodinated Contrast Media Rash     Recent Labs   Lab Test 04/26/23  1502 02/14/23  1407 10/01/21  1509   A1C  --  7.6*  --    LDL  --  130* 131*   HDL  --  50 43   TRIG  --  133 178*   ALT 26  --  19   CR 0.92 0.95 1.06   GFRESTIMATED 70 67 57*   POTASSIUM 3.9 4.5 3.7   TSH  --   --  3.02        Breast Cancer Screening:    FHS-7:       4/26/2023     1:49 PM   Breast CA Risk Assessment (FHS-7)   Did any of your first-degree relatives have breast or ovarian cancer? Yes   Did any of your relatives have bilateral breast cancer? Unknown   Did any man in your family have breast cancer? No   Did any woman in your family have breast and ovarian cancer? Yes   Did any woman in your family have breast cancer before age 50 y? Yes   Do you have 2 or more relatives with breast and/or ovarian cancer? Yes   Do you have 2 or more relatives with breast and/or bowel cancer? Yes       Mammogram Screening: Recommended annual mammography  Pertinent mammograms are reviewed under the imaging tab.    History of abnormal Pap smear: Status post benign hysterectomy. Health Maintenance and Surgical History updated.     Reviewed and updated as needed this visit by  "clinical staff   Tobacco  Allergies  Meds   Med Hx  Surg Hx  Fam Hx          Reviewed and updated as needed this visit by Provider       Med Hx  Surg Hx  Fam Hx         Past Medical History:   Diagnosis Date     Back pain 1992    X5 total 1994, 1998. 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      Past Surgical History:   Procedure Laterality Date     ANESTH,SKIN SURGERY, BACK  1992, 1994, 1998,2004     BREAST BIOPSY, RT/LT  11/10/1998    left breast biopsy     COLONOSCOPY  08/15/2019     HYSTERECTOMY, IMTIAZ  01/01/2000    with BSO     MASTECTOMY, BILATERAL  11/19/1998     ZZC ANESTH,SURG BREAST RECONSTRUCTIVE Bilateral 2004       Review of Systems   Constitutional: Negative for chills and fever.   HENT: Negative for congestion, ear pain, hearing loss and sore throat.    Eyes: Negative for pain and visual disturbance.   Respiratory: Negative for cough and shortness of breath.    Cardiovascular: Negative for chest pain, palpitations and peripheral edema.   Gastrointestinal: Negative for abdominal pain, constipation, diarrhea, heartburn, hematochezia and nausea.   Breasts:  Negative for tenderness, breast mass and discharge.   Genitourinary: Negative for dysuria, frequency, genital sores, hematuria, pelvic pain, urgency, vaginal bleeding and vaginal discharge.   Musculoskeletal: Negative for arthralgias, joint swelling and myalgias.   Skin: Negative for rash.   Neurological: Negative for dizziness, weakness, headaches and paresthesias.   Psychiatric/Behavioral: Negative for mood changes. The patient is not nervous/anxious.           OBJECTIVE:   /74   Pulse 69   Temp 97.7  F (36.5  C) (Tympanic)   Resp 14   Ht 1.67 m (5' 5.75\")   Wt 93 kg (205 lb)   LMP 08/31/2000   SpO2 98%   Breastfeeding No   BMI 33.34 kg/m    Physical Exam  GENERAL APPEARANCE: healthy, alert and no distress  EYES: " Eyes grossly normal to inspection, PERRL and conjunctivae and sclerae normal  HENT: ear canals and TM's normal, nose and mouth without ulcers or lesions, oropharynx clear and oral mucous membranes moist  NECK: no adenopathy, no asymmetry, masses, or scars and thyroid normal to palpation  RESP: lungs clear to auscultation - no rales, rhonchi or wheezes  BREAST: history of mastectomy   CV: regular rate and rhythm  ABDOMEN: soft, nontender, no hepatosplenomegaly, no masses and bowel sounds normal  MS: right shoulder with tenderness, decreased ROM, positive empty can sign   Foot exam within normal limits   SKIN: no suspicious lesions or rashes  NEURO: Normal strength and tone, sensory exam grossly normal, mentation intact and speech normal  PSYCH: mentation appears normal and affect normal/bright        ASSESSMENT/PLAN:       ICD-10-CM    1. Physical exam, annual  Z00.00       2. Type 2 diabetes mellitus without complication, without long-term current use of insulin (H)  E11.9 FOOT EXAM     AMB Adult Diabetes Educator Referral     Nutrition Referral     metFORMIN (GLUCOPHAGE XR) 500 MG 24 hr tablet     Comprehensive Metabolic Panel     Albumin Random Urine Quantitative with Creat Ratio     Albumin Random Urine Quantitative with Creat Ratio     Comprehensive Metabolic Panel      3. History of back surgery  Z98.890 amitriptyline (ELAVIL) 10 MG tablet      4. Closed fracture of multiple ribs with routine healing, unspecified laterality, subsequent encounter  S22.49XD       5. Class 1 obesity without serious comorbidity with body mass index (BMI) of 33.0 to 33.9 in adult, unspecified obesity type  E66.9     Z68.33       6. HX: breast cancer  Z85.3      Ca27.29  breast tumor marker          Ca27.29  breast tumor marker      7. Impingement syndrome of shoulder region, right  M75.41 Orthopedic  Referral        1.  Labs ordered as above  2.  Shoulder findings consistent with impingement - referral to  "orthopedics   3.  Patient requests cancer screening levels/labs  4.  Referral for MNT and diabetes ed.  Discussed indications to start metformin. Possible side effects reviewed.  Follow up in about 2 months - rechecked A1C then          COUNSELING:  Reviewed preventive health counseling, as reflected in patient instructions       Regular exercise       Healthy diet/nutrition       Vision screening      BMI:   Estimated body mass index is 33.34 kg/m  as calculated from the following:    Height as of this encounter: 1.67 m (5' 5.75\").    Weight as of this encounter: 93 kg (205 lb).   Weight management plan: Discussed healthy diet and exercise guidelines      She reports that she has never smoked. She has never used smokeless tobacco.      EMERY NINO MD  [facility]  "

## 2023-04-28 LAB — CANCER AG125 SERPL-ACNC: 14 U/ML

## 2023-04-29 LAB — CANCER AG27-29 SERPL-ACNC: 23.6 U/ML

## 2023-05-09 ENCOUNTER — OFFICE VISIT (OUTPATIENT)
Dept: FAMILY MEDICINE | Facility: OTHER | Age: 63
End: 2023-05-09
Payer: COMMERCIAL

## 2023-05-09 VITALS — BODY MASS INDEX: 32.95 KG/M2 | HEIGHT: 66 IN | WEIGHT: 205 LBS

## 2023-05-09 DIAGNOSIS — E11.9 TYPE 2 DIABETES MELLITUS WITHOUT COMPLICATION, WITHOUT LONG-TERM CURRENT USE OF INSULIN (H): ICD-10-CM

## 2023-05-09 PROCEDURE — 97802 MEDICAL NUTRITION INDIV IN: CPT | Performed by: DIETITIAN, REGISTERED

## 2023-05-09 NOTE — PROGRESS NOTES
Asha is here for MNT related to new Dx Type 2 DM.    PCP: Dr. Blue    Asha reports she had a hard time initially with accepting the Dx of Type 2 DM but now is ready to do what it takes to manage it.    She has been taking her Metformin as directed with little to no side effects.    She has noticed less appetite.    Lab Results   Component Value Date    A1C 7.6 02/14/2023     Body mass index is 33.39 kg/m .    Hx: Vit D deficiency    LDL Cholesterol Calculated   Date Value Ref Range Status   02/14/2023 130 (H) <=100 mg/dL Final     Direct Measure HDL   Date Value Ref Range Status   02/14/2023 50 >=50 mg/dL Final   ]  Triglycerides   Date Value Ref Range Status   02/14/2023 133 <150 mg/dL Final       Typical pattern:  B-2 toast, 2 eggs, sausage  L-D: eats a late lunch and then will snack/eat for 4 hours. Feels her portions are too big.    Is active with sled dogs. Does not do formal exercise.     Dx: new Dx Type 2 DM    Diet for Dx: CHO controlled meal plan, 30 grams of CHO per meal. 0 grams of CHO per snack    Goal:   B-15-30 grams CHO  L-30 grams CHO  D-30 grams CHO  Sn-0 grams CHO    Educated today on portion size and meal planning. Include lean protein, 20-25 grams per meal, Fiber and healthy fat.    Gave sample meal plans and recipes.  Encouraged daily exercise, 25-40 min at 65% MHR.     Gave basic DM information./ She is seeing the DM educator this month.    At this time, she prefers not to check her glucose at home.    She will follow-up with PCP in 6 weeks.    Goals:  A1C <7%  Weight loss 7% in 6 months    Plan:   CHO controlled meal plan, 3 meals and fast for 12-16 hours    Exercise: mod movement daily 25-40 min    Asha verbalized understanding of goals and plan and is motivated to move forward with health changes to manage her DM.    Time spent: 45 min    KRISTIN K. KLINEFELTER, RD on 5/9/2023 at 12:19 PM    Answers for HPI/ROS submitted by the patient on 5/6/2023  Frequency of checking blood  sugars:: not at all  Diabetic concerns:: none  Paraesthesia present:: weight gain  Have you had a diabetic eye exam within the last year?: No  How many servings of fruits and vegetables do you eat daily?: 2-3  On average, how many sweetened beverages do you drink each day (Examples: soda, juice, sweet tea, etc.  Do NOT count diet or artificially sweetened beverages)?: 2  How many minutes a day do you exercise enough to make your heart beat faster?: 9 or less  How many days a week do you exercise enough to make your heart beat faster?: 3 or less  How many days per week do you miss taking your medication?: 0

## 2023-05-31 ENCOUNTER — ALLIED HEALTH/NURSE VISIT (OUTPATIENT)
Dept: EDUCATION SERVICES | Facility: OTHER | Age: 63
End: 2023-05-31
Attending: FAMILY MEDICINE
Payer: COMMERCIAL

## 2023-05-31 DIAGNOSIS — M75.41 IMPINGEMENT SYNDROME OF SHOULDER REGION, RIGHT: ICD-10-CM

## 2023-05-31 PROCEDURE — G0108 DIAB MANAGE TRN  PER INDIV: HCPCS | Performed by: REGISTERED NURSE

## 2023-05-31 NOTE — PATIENT INSTRUCTIONS
Diabetes Goals and Reminders    Your A1c test should be done every 3 months.  Your goal is less than 7%.   Your last result is:  Lab Results   Component Value Date    A1C 7.6 02/14/2023       Your LDL cholesterol test should be done at least once a year.  Take a statin, if prescribed by your doctor, based on age and risk factors.  Your last result is:  LDL Cholesterol Calculated   Date Value Ref Range Status   02/14/2023 130 (H) <=100 mg/dL Final       Have blood pressure and weight checked every three months.  Your blood pressure goal is 130/80 or less.  Your last blood pressure reading was:   BP Readings from Last 1 Encounters:   04/26/23 118/74       Test your blood sugar 1 time per day.  Your home blood glucose target ranges are:  Fasting or Before meals:   2 hours after a meal: Less than 180      Avoid all tobacco.  Follow your healthy diet and exercise plan.  See the eye doctor every year.  See the dentist every six months.  Have kidney function tested yearly.    Take all medicine as prescribed.  Please let me know if you are having symptoms you don t expect or if you wish to stop any medicine.    Follow Up Plan  Please call or visit Diabetes Education if blood sugars are consistently out of target.  Your future lab plan is:  HgA1c at anytime.    If you need your cholesterol checked at your next appointment, you should fast 8 to 10 hours before your appointment.  Do not eat or drink anything besides water.  Drink plenty of water and take all your usual medicine.    SUMMARY FOR TODAY'S VISIT    1.  Consider testing blood sugar 1 time daily.    2.  Discussed the low carb plan to help decrease weight, improve blood pressure, improve A1c, decrease triglycerides and increase good HDL cholesterol.  These benefits were summarized from the ADA Consensus report in 2019.      Recommend beginning to count carbohydrates following the low carb plan:  Up to ~ 15 grams with breakfast, 30 grams with lunch and 30 grams  with supper.       A key strategy in this plan is, along with medication need, to participate in low to moderate physical activity, such as walking, working up to a minimum of 30 minutes most days, as tolerated.      3.  Please follow-up for continued diabetes education, as needed.     Chary Kirk RN, BSN, [facility]  5/31/2023 9:34 AM

## 2023-05-31 NOTE — PROGRESS NOTES
Diabetes Self-Management Education & Support    Presents for: Initial Assessment for new diagnosis    Type of Service: In Person Visit    Assessment Type:   ASSESSMENT:  Patient newly diagnosed DM2.  Discussed pathophysiology with interpretation and meaning of HgA1c.  Stressed importance of testing BG daily to help keep tight control of DM2, helping to minimize risk for possible complications of uncontrolled diabetes.  Discussed benefits of keeping A1c under 7% and encouraged patient to begin testing BG daily.     Discussed the low carb plan to help decrease weight, improve blood pressure, improve A1c, decrease triglycerides and increase good HDL cholesterol.  These benefits were summarized from the ADA Consensus report in 2019.      Begin carbohydrate counting, low carb plan:  Up to ~ 15 grams with breakfast, 30 grams with lunch and 30 grams with supper.       A key strategy in this plan is, along with medication need, to participate in low to moderate physical activity, such as walking, working up to a minimum of 30 minutes most days, as tolerated.      Patient requests to wait to learn how to SMBG.    Patient's most recent   Lab Results   Component Value Date    A1C 7.6 02/14/2023     is not meeting goal of <7.0    Diabetes knowledge and skills assessment:   Patient is knowledgeable in diabetes management concepts related to: Taking Medication    Continue education with the following diabetes management concepts: Healthy Eating, Being Active, Monitoring, Problem Solving, Reducing Risks and Healthy Coping    Based on learning assessment above, most appropriate setting for further diabetes education would be: Group class or Individual setting.      PLAN    Begin meal planning and physical activity.  See patient instructions for complete plan.     Topics to cover at upcoming visits: Monitoring, Problem Solving and Reducing Risks    Follow-up: TBD per patient schedule.     See Care Plan for co-developed,  "patient-state behavior change goals.  AVS provided for patient today.    Education Materials Provided:   Type 2 Diabetes packet, My Food Plan, Activity Pyramid, A1c flyer, Tips on SMBG, Diabetes Success Plan, DSMS sheet and New T2DM  folder.       SUBJECTIVE/OBJECTIVE:  Presents for: Initial Assessment for new diagnosis  Accompanied by: Self  Diabetes education in the past 24mo: No  Focus of Visit: Monitoring, Healthy Eating, Being Active, Diabetes Pathophysiology  Diabetes type: Type 2  Disease course: Other  How confident are you filling out medical forms by yourself:: Extremely  Cultural Influences/Ethnic Background:  Not  or     Diabetes Symptoms & Complications:  Fatigue: No  Neuropathy: No  Polydipsia: No  Polyphagia: Yes  Polyuria: No  Visual change: No  Slow healing wounds: No  Autonomic neuropathy: No  CVA: No  Heart disease: No  Nephropathy: No  Peripheral neuropathy: No  Peripheral Vascular Disease: No  Retinopathy: No  Sexual dysfunction: No    Patient Problem List and Family Medical History reviewed for relevant medical history, current medical status, and diabetes risk factors.    Vitals:  LMP 08/31/2000   Estimated body mass index is 33.39 kg/m  as calculated from the following:    Height as of 5/9/23: 1.669 m (5' 5.7\").    Weight as of 5/9/23: 93 kg (205 lb).   Last 3 BP:   BP Readings from Last 3 Encounters:   04/26/23 118/74   02/14/23 132/76   06/01/22 132/74       History   Smoking Status     Never   Smokeless Tobacco     Never       Labs:  Lab Results   Component Value Date    A1C 7.6 02/14/2023     Lab Results   Component Value Date     04/26/2023     10/01/2021     08/31/2010     Lab Results   Component Value Date     02/14/2023     Direct Measure HDL   Date Value Ref Range Status   02/14/2023 50 >=50 mg/dL Final   ]  GFR Estimate   Date Value Ref Range Status   04/26/2023 70 >60 mL/min/1.73m2 Final     Comment:     eGFR calculated using 2021 CKD-EPI " equation.   08/31/2010 75 >60 mL/min/1.7m2 Final     GFR Estimate If Black   Date Value Ref Range Status   08/31/2010 >90 >60 mL/min/1.7m2 Final     Lab Results   Component Value Date    CR 0.92 04/26/2023    CR 0.81 08/31/2010     No results found for: MICROALBUMIN    Healthy Eating:  Healthy Eating Assessed Today: Yes  Cultural/Restorationism diet restrictions?: No  Meal planning/habits: Avoiding sweets, Carb counting, Smaller portions  How many times a week on average do you eat food made away from home (restaurant/take-out)?: 2  Meals include: Breakfast, Lunch, Dinner, Afternoon Snack  Beverages: Water, Coffee, Diet soda    Being Active:  Days per week of moderate to strenuous exercise (like a brisk walk): (P) 3  On average, minutes per day of exercise at this level: (P) 20  How intense was your typical exercise? : (P) Moderate (like brisk walking)  Exercise Minutes per Week: (P) 60  Barrier to exercise: None    Monitoring:  Blood Glucose Meter: Other  Times checking blood sugar at home (number): Never      Taking Medications:  Diabetes Medication(s)     Biguanides       metFORMIN (GLUCOPHAGE XR) 500 MG 24 hr tablet    Take 1 tablet (500 mg) by mouth daily (with dinner)          Current Treatments: Oral Medication (taken by mouth)        Reducing Risks:  CAD Risks: Diabetes Mellitus, Family history, Obesity  Has dilated eye exam at least once a year?: Yes  Sees dentist every 6 months?: No  Feet checked by healthcare provider in the last year?: Yes    Healthy Coping:  Informal Support system:: Family, Friends, Partner  Stage of change: ACTION (Actively working towards change)  Support resources: Offerings in Clinic Communities  Patient Activation Measure Survey Score:       View : No data to display.                  Care Plan and Education Provided:  Care Plan: Diabetes   Updates made by Chary Kirk RN since 5/31/2023 12:00 AM      Problem: HbA1C Not In Goal       Goal: Establish Regular Follow-Ups with PCP        Task: Discuss with PCP the recommended timing for patient's next follow up visit(s)    Responsible User: Chary Kirk RN      Task: Discuss schedule for PCP visits with patient Completed 5/31/2023   Responsible User: Chary Kirk RN      Goal: Get HbA1C Level in Goal       Task: Educate patient on diabetes education self-management topics Completed 5/31/2023   Responsible User: Chary Kirk RN      Task: Educate patient on benefits of regular glucose monitoring Completed 5/31/2023   Responsible User: Chary Kirk RN      Task: Refer patient to appropriate extended care team member, as needed (Medication Therapy Management, Behavioral Health, Physical Therapy, etc.)    Responsible User: Chary Kirk RN      Task: Discuss diabetes treatment plan with patient Completed 5/31/2023   Responsible User: Chary Kirk RN      Problem: Diabetes Self-Management Education Needed to Optimize Self-Care Behaviors       Goal: Understand diabetes pathophysiology and disease progression       Task: Provide education on diabetes pathophysiology and disease progression specfic to patient's diabetes type Completed 5/31/2023   Responsible User: Chary Kirk RN      Goal: Healthy Eating - follow a healthy eating pattern for diabetes       Task: Provide education on portion control and consistency in amount, composition and timing of food intake Completed 5/31/2023   Responsible User: Chary Kirk RN      Task: Provide education on managing carbohydrate intake (carbohydrate counting, plate planning method, etc.) Completed 5/31/2023   Responsible User: Chary Kirk RN      Task: Provide education on weight management    Responsible User: Chary Kirk RN      Task: Provide education on heart healthy eating    Responsible User: Chary Kirk RN      Task: Provide education on eating out    Responsible User: Chary Kirk RN      Task: Develop individualized healthy eating plan with patient     Responsible User: Chary Kirk RN      Goal: Being Active - get regular physical activity, working up to at least 150 minutes per week       Task: Provide education on relationship of activity to glucose and precautions to take if at risk for low glucose Completed 5/31/2023   Responsible User: Chary Kirk RN      Task: Discuss barriers to physical activity with patient    Responsible User: Chary Kirk RN      Task: Develop physical activity plan with patient    Responsible User: Chary Kirk RN      Task: Explore community resources including walking groups, assistance programs, and home videos    Responsible User: Chary Kirk RN      Goal: Monitoring - monitor glucose and ketones as directed       Task: Provide education on blood glucose monitoring (purpose, proper technique, frequency, glucose targets, interpreting results, when to use glucose control solution, sharps disposal)    Responsible User: Chary Kirk RN      Task: Provide education on continuous glucose monitoring (sensor placement, use of elisabeth or /reader, understanding glucose trends, alerts and alarms, differences between sensor glucose and blood glucose)    Responsible User: Chary Kirk RN      Task: Provide education on ketone monitoring (when to monitor, frequency, etc.)    Responsible User: Chary Kirk RN      Goal: Taking Medication - patient is consistently taking medications as directed       Task: Provide education on action of prescribed medication, including when to take and possible side effects Completed 5/31/2023   Responsible User: Chary Kirk RN      Task: Provide education on insulin and injectable diabetes medications, including administration, storage, site selection and rotation for injection sites    Responsible User: Chary Kirk RN      Task: Discuss barriers to medication adherence with patient and provide management technique ideas as appropriate    Responsible User:  Chary Kirk RN      Task: Provide education on frequency and refill details of medications    Responsible User: Chary Kirk RN      Goal: Problem Solving - know how to prevent and manage short-term diabetes complications       Task: Provide education on high blood glucose - causes, signs/symptoms, prevention and treatment Completed 5/31/2023   Responsible User: Chary Kirk RN      Task: Provide education on low blood glucose - causes, signs/symptoms, prevention, treatment, carrying a carbohydrate source at all times, and medical identification    Responsible User: Chary Kirk RN      Task: Provide education on safe travel with diabetes    Responsible User: Chary Kirk RN      Task: Provide education on how to care for diabetes on sick days    Responsible User: Chary Kirk RN      Task: Provide education on when to call a health care provider    Responsible User: Chary Kirk RN      Goal: Reducing Risks - know how to prevent and treat long-term diabetes complications       Task: Provide education on major complications of diabetes, prevention, early diagnostic measures and treatment of complications Completed 5/31/2023   Responsible User: Chary Kirk RN      Task: Provide education on recommended care for dental, eye and foot health Completed 5/31/2023   Responsible User: Chary Kirk RN      Task: Provide education on Hemoglobin A1c - goals and relationship to blood glucose levels    Responsible User: Chary Kirk RN      Task: Provide education on recommendations for heart health - lipid levels and goals, blood pressure and goals, and aspirin therapy, if indicated    Responsible User: Chary Kirk RN      Task: Provide education on tobacco cessation    Responsible User: Chary Kirk RN      Goal: Healthy Coping - use available resources to cope with the challenges of managing diabetes       Task: Discuss recognizing feelings about having diabetes     Responsible User: Chary Kirk RN      Task: Provide education on the benefits of making appropriate lifestyle changes Completed 5/31/2023   Responsible User: Chary Kirk RN      Task: Provide education on benefits of utilizing support systems    Responsible User: Chary Kirk RN      Task: Discuss methods for coping with stress    Responsible User: Chary Kirk RN      Task: Provide education on when to seek professional counseling    Responsible User: Chary Kirk RN Suzette Childs, RN, BSN, [facility]  5/31/2023 11:10 AM     Time Spent: 60 minutes  Encounter Type: Individual    Any diabetes medication dose changes were made via the CDE Protocol per the patient's primary care provider. A copy of this encounter was shared with the provider.

## 2023-06-19 ENCOUNTER — TELEPHONE (OUTPATIENT)
Dept: FAMILY MEDICINE | Facility: OTHER | Age: 63
End: 2023-06-19
Payer: COMMERCIAL

## 2023-06-19 DIAGNOSIS — M75.41 IMPINGEMENT SYNDROME OF SHOULDER REGION, RIGHT: Primary | ICD-10-CM

## 2023-06-19 NOTE — TELEPHONE ENCOUNTER
Patient states she was suppose to be referred to Orthopedics for her shoulder. This was back in April and she was never contacted by anyone to schedule. Upon chart review it states referral was closed. Patient would still like to see them. Referral order pending.     Sparkle Moreira CMA on 6/19/2023 at 4:20 PM

## 2023-07-13 ENCOUNTER — TRANSFERRED RECORDS (OUTPATIENT)
Dept: HEALTH INFORMATION MANAGEMENT | Facility: OTHER | Age: 63
End: 2023-07-13
Payer: COMMERCIAL

## 2023-07-19 ENCOUNTER — HOSPITAL ENCOUNTER (OUTPATIENT)
Dept: MRI IMAGING | Facility: OTHER | Age: 63
Discharge: HOME | End: 2023-07-19
Attending: ORTHOPAEDIC SURGERY
Payer: COMMERCIAL

## 2023-07-19 DIAGNOSIS — G54.2 CERVICAL NERVE ROOT IMPINGEMENT: ICD-10-CM

## 2023-07-19 DIAGNOSIS — M75.41 IMPINGEMENT SYNDROME OF RIGHT SHOULDER: ICD-10-CM

## 2023-07-19 PROCEDURE — 73221 MRI JOINT UPR EXTREM W/O DYE: CPT | Mod: RT

## 2023-07-19 PROCEDURE — 72141 MRI NECK SPINE W/O DYE: CPT

## 2023-07-21 ENCOUNTER — MYC MEDICAL ADVICE (OUTPATIENT)
Dept: FAMILY MEDICINE | Facility: OTHER | Age: 63
End: 2023-07-21
Payer: COMMERCIAL

## 2023-07-21 DIAGNOSIS — M75.101 ROTATOR CUFF SYNDROME, RIGHT: Primary | ICD-10-CM

## 2023-07-21 DIAGNOSIS — E11.9 TYPE 2 DIABETES MELLITUS WITHOUT COMPLICATION, WITHOUT LONG-TERM CURRENT USE OF INSULIN (H): ICD-10-CM

## 2023-07-24 ENCOUNTER — TRANSFERRED RECORDS (OUTPATIENT)
Dept: HEALTH INFORMATION MANAGEMENT | Facility: OTHER | Age: 63
End: 2023-07-24
Payer: COMMERCIAL

## 2023-07-28 ENCOUNTER — LAB (OUTPATIENT)
Dept: LAB | Facility: OTHER | Age: 63
End: 2023-07-28
Attending: FAMILY MEDICINE
Payer: COMMERCIAL

## 2023-07-28 ENCOUNTER — THERAPY VISIT (OUTPATIENT)
Dept: PHYSICAL THERAPY | Facility: OTHER | Age: 63
End: 2023-07-28
Attending: ORTHOPAEDIC SURGERY
Payer: COMMERCIAL

## 2023-07-28 DIAGNOSIS — S46.001D INJURY OF RIGHT ROTATOR CUFF, SUBSEQUENT ENCOUNTER: Primary | ICD-10-CM

## 2023-07-28 DIAGNOSIS — E11.9 TYPE 2 DIABETES MELLITUS WITHOUT COMPLICATION, WITHOUT LONG-TERM CURRENT USE OF INSULIN (H): ICD-10-CM

## 2023-07-28 PROBLEM — S46.001A INJURY OF RIGHT ROTATOR CUFF: Status: ACTIVE | Noted: 2023-07-28

## 2023-07-28 LAB — HBA1C MFR BLD: 7 % (ref 4–6.2)

## 2023-07-28 PROCEDURE — 97110 THERAPEUTIC EXERCISES: CPT | Mod: GP

## 2023-07-28 PROCEDURE — 83036 HEMOGLOBIN GLYCOSYLATED A1C: CPT | Mod: ZL

## 2023-07-28 PROCEDURE — 97161 PT EVAL LOW COMPLEX 20 MIN: CPT | Mod: GP

## 2023-07-28 PROCEDURE — 36415 COLL VENOUS BLD VENIPUNCTURE: CPT | Mod: ZL

## 2023-07-28 NOTE — PROGRESS NOTES
PHYSICAL THERAPY EVALUATION  Type of Visit: Evaluation    See electronic medical record for Abuse and Falls Screening details.    Subjective       Presenting condition or subjective complaint:   Patient had fall/injury resulting in rib fractures with continued shoulder pain in February. 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 Once she started return to PLOF mobility was unable to elevate arm or reach out to her side. MRI shoulder a 3x3 cm full thickness tear of the rotator cuff involving supraspinatus and infraspintatus.  She is working to get scheduled for surgery with Dr. Jorgensen within the next month or so. Does have some numbness in her lateral forearm and history of cervical fusions. Reports she doesn't know for certain that the numbness was exacerbated by the accident.    Date of onset: 02/04/23    Relevant medical history:     Dates & types of surgery:   multiple cervical and low back surgeries    Prior diagnostic imaging/testing results:        Narrative & Impression   Examination: MR SHOULDER RIGHT W/O CONTRAST  7/19/2023 8:21 PM     Clinical History: Female, age 63 years, Impingement syndrome of right  shoulder     Comparison: No relevant prior imaging.     Technique:  Axial and coronal proton-density T2 fat saturation;  sagittal T1 and T2 fat saturation; axial gradient echo     Findings:  AC joint is congruent demonstrate to moderate hypertrophic  degenerative change. Undersurface spurring abuts a torn, retracted  portion of the rotator cuff.      Large undersurface spurs of the acromion process also abut the torn  portions of the rotator cuff.      There is a 3 cm x 3 Center full-thickness defect involving the  conjoined tendon,  "posterior aspects of the supraspinatous and anterior  aspects of the infraspinatus tendon. Portions of the rotator cuff  remain attached at the greater tuberosity.     Biceps labral anchor is intact. There is mild/moderate tendinosis  involving the intra-articular portions of the biceps tendon.     Subscapularis and teres minor muscles and tendons are intact.  Glenohumeral articulation is congruent. There is small concentric tear  involving the anterior superior labrum from 1:00 to 3:00. Glenoid  articular cartilage is normal in thickness. There is near  full-thickness loss of the humeral articular cartilage subjacent to  the torn portions of the rotator cuff.       Muscles of the shoulder demonstrate mild atrophy of the  supraspinatous, infraspinatus and teres minor. Neurovascular  structures appear grossly normal.                                                                      IMPRESSION:   3 cm x 3 cm full-thickness rotator cuff tear. Portions of the rotator  cuff remain attached at the greater tuberosity.     Large spurs along the undersurface of the acromion process and at the  AC joint abuts the torn, retracted portions of the rotator cuff.     Small concentric tear suggested in the anterior superior labrum.        Prior therapy history for the same diagnosis, illness or injury:     none    Prior Level of Function  Transfers: Independent  Ambulation: Independent  ADL:  painful; has to use L UE to assist R  IADL:  painful    Living Environment  Social support:   partner  Type of home:   single story  Stairs to enter the home:         Ramp:     Stairs inside the home:         Help at home:    Equipment owned:       Employment:     \"part-time sled dog rides\"  Hobbies/Interests:   sled dogs -- has 34 dogs    Patient goals for therapy:   \"no pain and my strength back\"    Pain assessment: See objective evaluation for additional pain details     Objective   SHOULDER EVALUATION  PAIN: Pain Level at Rest: " 0/10  Pain Level with Use: 8/10  Pain Location: shoulder  Pain Quality: Aching, Burning, and Sharp  Pain Frequency: constant  Pain is Worst: lifting, carrying, sleeping position, certain positions, reaching overhead, ADLs and IADLs  Pain is Exacerbated By: see above  Pain is Relieved By: NSAIDs, otc medications, and changing position  Pain Progression: Worsened  INTEGUMENTARY (edema, incisions):   POSTURE:   ROM:   (Degrees) Left AROM Left PROM Right AROM  Right PROM   Shoulder Flexion WNL  130 +painful arc    Shoulder Extension WNL      Shoulder Abduction   130 +pain at end range    Shoulder Adduction       Shoulder Internal Rotation   65    Shoulder External Rotation   35 +tight      Shoulder Horizontal Abduction       Shoulder Horizontal Adduction       Shoulder Flexion ER       Shoulder Flexion IR       Elbow Extension       Elbow Flexion       Pain:   End feel:   STRENGTH:   Pain: - none + mild ++ moderate +++ severe  Strength Scale: 0-5/5 Left Right   Shoulder Flexion 5 4   Shoulder Extension 5 3   Shoulder Abduction     Shoulder Adduction     Shoulder Internal Rotation 5 5   Shoulder External Rotation 5 Deferred testing   Shoulder Horizontal Abduction     Shoulder Horizontal Adduction     Elbow Flexion 5 5 +pain   Elbow Extension 5 5   Mid Trap     Lower Trap     Rhomboid     Serratus Anterior       SPECIAL TESTS: deferred due to nature of patient's pain presentation and patient already having documented rotator cuff disorder  PALPATION:  tender along R GH joint line; lateral deltoid and AC joint  JOINT MOBILITY:  stiffness at end range; did not attempt PA of GH joint  CERVICAL SCREEN:  limited side bending B (previous cervical fusion)    Assessment & Plan   CLINICAL IMPRESSIONS  Medical Diagnosis: S46.001A (ICD-10-CM) - Rotator cuff injury, right, initial encounter    Treatment Diagnosis: R rotator cuff injury   Impression/Assessment: Patient is a 63 year old female with R shoulder pain/weakness complaints.   The following significant findings have been identified: Pain, Decreased ROM/flexibility, Decreased joint mobility, Decreased strength, Impaired sensation, Impaired muscle performance, Decreased activity tolerance, and Impaired posture. These impairments interfere with their ability to perform self care tasks and recreational activities as compared to previous level of function.  Patient requiring strengthening and posture exercises prior to expected surgery on R rotator cuff in ~1 month.    Clinical Decision Making (Complexity):  Clinical Presentation: Stable/Uncomplicated  Clinical Presentation Rationale: based on medical and personal factors listed in PT evaluation  Clinical Decision Making (Complexity): Low complexity    PLAN OF CARE  Treatment Interventions:  Modalities: Cryotherapy, E-stim  Interventions: Manual Therapy, Neuromuscular Re-education, Therapeutic Activity, Therapeutic Exercise      Long Term Goals     PT Goal 1  Goal Identifier: ROM  Goal Description: Patient will achieve neutral pain-free ROM into flexion 90 degrees; abduciton 90 degrees and IR/ER with elbow at side of body to maintain functional arm prior to eventual surgery  Target Date: 08/25/23  PT Goal 2  Goal Identifier: strength  Goal Description: Patient will increase strength of scap stabilizer to 4/5 to increase stability for shoulder surgery wtihin 4 weeks  Target Date: 08/25/23  PT Goal 3  Goal Identifier: HEP  Goal Description: Patient will be independent with HEP and understanding post-hab exercises  Target Date: 08/11/23      Frequency of Treatment: 1x week  Duration of Treatment: 4 weeks    Recommended Referrals to Other Professionals:   Education Assessment:   Learner/Method: Patient;Listening;Reading;Demonstration;Pictures/Video    Risks and benefits of evaluation/treatment have been explained.   Patient/Family/caregiver agrees with Plan of Care.     Evaluation Time:     PT Eval, Low Complexity Minutes (63219): 30       Signing  Clinician: Cristina Bee, PT

## 2023-08-08 ENCOUNTER — THERAPY VISIT (OUTPATIENT)
Dept: PHYSICAL THERAPY | Facility: OTHER | Age: 63
End: 2023-08-08
Attending: ORTHOPAEDIC SURGERY
Payer: COMMERCIAL

## 2023-08-08 ENCOUNTER — OFFICE VISIT (OUTPATIENT)
Dept: FAMILY MEDICINE | Facility: OTHER | Age: 63
End: 2023-08-08
Attending: NURSE PRACTITIONER
Payer: COMMERCIAL

## 2023-08-08 VITALS
HEIGHT: 66 IN | TEMPERATURE: 98.5 F | SYSTOLIC BLOOD PRESSURE: 130 MMHG | RESPIRATION RATE: 16 BRPM | BODY MASS INDEX: 32.43 KG/M2 | DIASTOLIC BLOOD PRESSURE: 80 MMHG | OXYGEN SATURATION: 99 % | WEIGHT: 201.8 LBS | HEART RATE: 76 BPM

## 2023-08-08 DIAGNOSIS — S46.001D INJURY OF RIGHT ROTATOR CUFF, SUBSEQUENT ENCOUNTER: ICD-10-CM

## 2023-08-08 DIAGNOSIS — Z01.818 PREOP GENERAL PHYSICAL EXAM: Primary | ICD-10-CM

## 2023-08-08 DIAGNOSIS — S46.001D INJURY OF RIGHT ROTATOR CUFF, SUBSEQUENT ENCOUNTER: Primary | ICD-10-CM

## 2023-08-08 DIAGNOSIS — E11.9 TYPE 2 DIABETES MELLITUS WITHOUT COMPLICATION, WITHOUT LONG-TERM CURRENT USE OF INSULIN (H): ICD-10-CM

## 2023-08-08 LAB
ANION GAP SERPL CALCULATED.3IONS-SCNC: 12 MMOL/L (ref 7–15)
BUN SERPL-MCNC: 20.8 MG/DL (ref 8–23)
CALCIUM SERPL-MCNC: 9.4 MG/DL (ref 8.8–10.2)
CHLORIDE SERPL-SCNC: 101 MMOL/L (ref 98–107)
CREAT SERPL-MCNC: 0.89 MG/DL (ref 0.51–0.95)
DEPRECATED HCO3 PLAS-SCNC: 26 MMOL/L (ref 22–29)
GFR SERPL CREATININE-BSD FRML MDRD: 72 ML/MIN/1.73M2
GLUCOSE SERPL-MCNC: 182 MG/DL (ref 70–99)
HGB BLD-MCNC: 12.6 G/DL (ref 11.7–15.7)
POTASSIUM SERPL-SCNC: 3.8 MMOL/L (ref 3.4–5.3)
SODIUM SERPL-SCNC: 139 MMOL/L (ref 136–145)

## 2023-08-08 PROCEDURE — 93000 ELECTROCARDIOGRAM COMPLETE: CPT | Performed by: INTERNAL MEDICINE

## 2023-08-08 PROCEDURE — 80048 BASIC METABOLIC PNL TOTAL CA: CPT | Mod: ZL | Performed by: NURSE PRACTITIONER

## 2023-08-08 PROCEDURE — 85018 HEMOGLOBIN: CPT | Mod: ZL | Performed by: NURSE PRACTITIONER

## 2023-08-08 PROCEDURE — 36415 COLL VENOUS BLD VENIPUNCTURE: CPT | Mod: ZL | Performed by: NURSE PRACTITIONER

## 2023-08-08 PROCEDURE — 97110 THERAPEUTIC EXERCISES: CPT | Mod: GP

## 2023-08-08 PROCEDURE — 99214 OFFICE O/P EST MOD 30 MIN: CPT | Performed by: NURSE PRACTITIONER

## 2023-08-08 ASSESSMENT — PAIN SCALES - GENERAL: PAINLEVEL: MILD PAIN (3)

## 2023-08-08 NOTE — PROGRESS NOTES
[facility] AND [facility]  [address]  [place] [address]  Phone: [phone]  Fax: [phone]  Primary Provider: Bita Cordova  Pre-op Performing Provider: AMBREEN PHAN      PREOPERATIVE EVALUATION:  Today's date: 8/8/2023    Asha Ardon is a 63 year old female who presents for a preoperative evaluation.      Surgical Information:  Surgery/Procedure: RT rotator cuff  Surgery Location: [facility]  Surgeon:   Surgery Date: 08/14/23  Time of Surgery:    Where patient plans to recover: At home with family  Fax number for surgical facility: [phone]    Assessment & Plan     The proposed surgical procedure is considered INTERMEDIATE risk.    Problem List Items Addressed This Visit          Endocrine    Diabetes mellitus, type 2 (H)       Musculoskeletal and Integumentary    Injury of right rotator cuff     Other Visit Diagnoses       Preop general physical exam    -  Primary                - No identified additional risk factors other than previously addressed    Antiplatelet or Anticoagulation Medication Instructions:   - Patient is on no antiplatelet or anticoagulation medications.    Additional Medication Instructions:   - metformin: HOLD day of surgery.    RECOMMENDATION:  APPROVAL GIVEN to proceed with proposed procedure, without further diagnostic evaluation.    Review of the result(s) of each unique test - EKG, BMP, Hgb  Ordering of each unique test  26 minutes spent by me on the date of the encounter doing chart review, history and exam, documentation and further activities per the note      Subjective       HPI related to upcoming procedure: Presents for preop clearance for right rotator cuff injury. Injured shoulder on a fall in February. Has ongoing pain, did have PT in preparation for surgery. On metformin for DM, under good control. Last A1C 7.0.         8/8/2023     3:41 PM   Preop Questions   1. Have you ever had a heart attack or  stroke? No   2. Have you ever had surgery on your heart or blood vessels, such as a stent placement, a coronary artery bypass, or surgery on an artery in your head, neck, heart, or legs? No   3. Do you have chest pain with activity? No   4. Do you have a history of  heart failure? No   5. Do you currently have a cold, bronchitis or symptoms of other infection? No   6. Do you have a cough, shortness of breath, or wheezing? No   7. Do you or anyone in your family have previous history of blood clots? No   8. Do you or does anyone in your family have a serious bleeding problem such as prolonged bleeding following surgeries or cuts? No   9. Have you ever had problems with anemia or been told to take iron pills? No   10. Have you had any abnormal blood loss such as black, tarry or bloody stools, or abnormal vaginal bleeding? No   11. Have you ever had a blood transfusion? No   12. Are you willing to have a blood transfusion if it is medically needed before, during, or after your surgery? Yes   13. Have you or any of your relatives ever had problems with anesthesia? No   14. Do you have sleep apnea, excessive snoring or daytime drowsiness? No   15. Do you have any artifical heart valves or other implanted medical devices like a pacemaker, defibrillator, or continuous glucose monitor? No   16. Do you have artificial joints? No   17. Are you allergic to latex? No       Health Care Directive:  Patient does not have a Health Care Directive or Living Will: Discussed advance care planning with patient; however, patient declined at this time.    Preoperative Review of :   reviewed - no record of controlled substances prescribed.      Status of Chronic Conditions:  See problem list for active medical problems.  Problems all longstanding and stable, except as noted/documented.  See ROS for pertinent symptoms related to these conditions.    Review of Systems  Constitutional, neuro, ENT, endocrine, pulmonary, cardiac,  gastrointestinal, genitourinary, musculoskeletal, integument and psychiatric systems are negative, except as otherwise noted.    Patient Active Problem List    Diagnosis Date Noted    Injury of right rotator cuff 07/28/2023     Priority: Medium    Diabetes mellitus, type 2 (H) 04/25/2023     Priority: Medium    Rib fractures 02/04/2023     Priority: Medium    Pneumothorax 02/04/2023     Priority: Medium    At high risk for tick borne illness 06/01/2022     Priority: Medium    HX: breast cancer 10/03/2021     Priority: Medium    Irritable bowel syndrome with constipation 10/03/2021     Priority: Medium    History of back surgery 10/03/2021     Priority: Medium    Diverticulosis 12/21/2011     Priority: Medium      Past Medical History:   Diagnosis Date    Back pain 1992    X5 total 1994, 1998. 2004    Breast cancer (H) 11/10/1998    bilateral mastectomy for left breast DCIS    Thrombocytopenia (H) 08/17/2010    resolved, possible anaplasmosis     Past Surgical History:   Procedure Laterality Date    ANESTH,SKIN SURGERY, BACK  1992, 1994, 1998,2004    BREAST BIOPSY, RT/LT  11/10/1998    left breast biopsy    COLONOSCOPY  08/15/2019    HYSTERECTOMY, IMTIAZ  01/01/2000    with BSO    MASTECTOMY, BILATERAL  11/19/1998    ZZC ANESTH,SURG BREAST RECONSTRUCTIVE Bilateral 2004     Current Outpatient Medications   Medication Sig Dispense Refill    amitriptyline (ELAVIL) 10 MG tablet 1-2 at bedtime for back pain 30 tablet 1    metFORMIN (GLUCOPHAGE XR) 500 MG 24 hr tablet Take 1 tablet (500 mg) by mouth daily (with dinner) 90 tablet 3       Allergies   Allergen Reactions    Ancef [Cefazolin Sodium] Rash    Biaxin [Clarithromycin] Rash    Contrast Dye Rash    Iodinated Contrast Media Rash        Social History     Tobacco Use    Smoking status: Never    Smokeless tobacco: Never   Substance Use Topics    Alcohol use: Yes     Comment: socially - couple drinks every 3 months     Family History   Problem Relation Age of Onset     "Valvular heart disease Mother         2018    Heart Disease Father     Dementia Father         vascular    Breast Cancer Sister 29    Breast Cancer Sister         DCIS    Esophageal Cancer Brother     No Known Problems Brother     Breast Cancer Maternal Grandmother      History   Drug Use No         Objective     /80   Pulse 76   Temp 98.5  F (36.9  C)   Resp 16   Ht 1.67 m (5' 5.75\")   Wt 91.5 kg (201 lb 12.8 oz)   LMP 08/31/2000   SpO2 99%   BMI 32.82 kg/m      Physical Exam    GENERAL APPEARANCE: healthy, alert and no distress     EYES: EOMI, PERRL     HENT: ear canals and TM's normal and nose and mouth without ulcers or lesions     NECK: no adenopathy, no asymmetry, masses, or scars and thyroid normal to palpation     RESP: lungs clear to auscultation - no rales, rhonchi or wheezes     CV: regular rates and rhythm, normal S1 S2, no S3 or S4 and no murmur, click or rub     ABDOMEN:  soft, nontender, no HSM or masses and bowel sounds normal     MS: extremities normal- no gross deformities noted, no evidence of inflammation in joints, FROM in all extremities.     SKIN: no suspicious lesions or rashes     NEURO: Normal strength and tone, sensory exam grossly normal, mentation intact and speech normal     PSYCH: mentation appears normal. and affect normal/bright     LYMPHATICS: No cervical adenopathy    Recent Labs   Lab Test 07/28/23  0854 04/26/23  1502 02/14/23  1407 10/01/21  1509   HGB  --   --   --  12.9   PLT  --   --   --  231   NA  --  139 141 136   POTASSIUM  --  3.9 4.5 3.7   CR  --  0.92 0.95 1.06   A1C 7.0*  --  7.6*  --         Diagnostics:  Recent Results (from the past 24 hour(s))   Basic Metabolic Panel    Collection Time: 08/08/23  4:24 PM   Result Value Ref Range    Sodium 139 136 - 145 mmol/L    Potassium 3.8 3.4 - 5.3 mmol/L    Chloride 101 98 - 107 mmol/L    Carbon Dioxide (CO2) 26 22 - 29 mmol/L    Anion Gap 12 7 - 15 mmol/L    Urea Nitrogen 20.8 8.0 - 23.0 mg/dL    Creatinine " 0.89 0.51 - 0.95 mg/dL    Calcium 9.4 8.8 - 10.2 mg/dL    Glucose 182 (H) 70 - 99 mg/dL    GFR Estimate 72 >60 mL/min/1.73m2   Hemoglobin    Collection Time: 08/08/23  4:24 PM   Result Value Ref Range    Hemoglobin 12.6 11.7 - 15.7 g/dL      EKG: appears normal, NSR, normal axis, normal intervals, no acute ST/T changes c/w ischemia, no LVH by voltage criteria, unchanged from previous tracings    Revised Cardiac Risk Index (RCRI):  The patient has the following serious cardiovascular risks for perioperative complications:   - No serious cardiac risks = 0 points     RCRI Interpretation: 0 points: Class I (very low risk - 0.4% complication rate)         Signed Electronically by: KIN Patel CNP  Copy of this evaluation report is provided to requesting physician.

## 2023-08-08 NOTE — NURSING NOTE
Patient presents today for pre op exam.    Medication Reconciliation Complete    Jacqueline Mark LPN  8/8/2023 3:55 PM

## 2023-08-08 NOTE — PATIENT INSTRUCTIONS
For informational purposes only. Not to replace the advice of your health care provider. Copyright   2003,  [facility]. All rights reserved. Clinically reviewed by Ángela Siegel MD. RIDERS [id number] - REV .  Preparing for Your Surgery  Getting started  A nurse will call you to review your health history and instructions. They will give you an arrival time based on your scheduled surgery time. Please be ready to share:  Your doctor's clinic name and phone number  Your medical, surgical, and anesthesia history  A list of allergies and sensitivities  A list of medicines, including herbal treatments and over-the-counter drugs  Whether the patient has a legal guardian (ask how to send us the papers in advance)  Please tell us if you're pregnant--or if there's any chance you might be pregnant. Some surgeries may injure a fetus (unborn baby), so they require a pregnancy test. Surgeries that are safe for a fetus don't always need a test, and you can choose whether to have one.   If you have a child who's having surgery, please ask for a copy of Preparing for Your Child's Surgery.    Preparing for surgery  Within 10 to 30 days of surgery: Have a pre-op exam (sometimes called an H&P, or History and Physical). This can be done at a clinic or pre-operative center.  If you're having a , you may not need this exam. Talk to your care team.  At your pre-op exam, talk to your care team about all medicines you take. If you need to stop any medicines before surgery, ask when to start taking them again.  We do this for your safety. Many medicines can make you bleed too much during surgery. Some change how well surgery (anesthesia) drugs work.  Call your insurance company to let them know you're having surgery. (If you don't have insurance, call [phone].)  Call your clinic if there's any change in your health. This includes signs of a cold or flu (sore throat, runny nose, cough, rash, fever). It also  includes a scrape or scratch near the surgery site.  If you have questions on the day of surgery, call your hospital or surgery center.  Eating and drinking guidelines  For your safety: Unless your surgeon tells you otherwise, follow the guidelines below.  Eat and drink as usual until 8 hours before you arrive for surgery. After that, no food or milk.  Drink clear liquids until 2 hours before you arrive. These are liquids you can see through, like water, Gatorade, and Propel Water. They also include plain black coffee and tea (no cream or milk), candy, and breath mints. You can spit out gum when you arrive.  If you drink alcohol: Stop drinking it the night before surgery.  If your care team tells you to take medicine on the morning of surgery, it's okay to take it with a sip of water.  Preventing infection  Shower or bathe the night before and morning of your surgery. Follow the instructions your clinic gave you. (If no instructions, use regular soap.)  Don't shave or clip hair near your surgery site. We'll remove the hair if needed.  Don't smoke or vape the morning of surgery. You may chew nicotine gum up to 2 hours before surgery. A nicotine patch is okay.  Note: Some surgeries require you to completely quit smoking and nicotine. Check with your surgeon.  Your care team will make every effort to keep you safe from infection. We will:  Clean our hands often with soap and water (or an alcohol-based hand rub).  Clean the skin at your surgery site with a special soap that kills germs.  Give you a special gown to keep you warm. (Cold raises the risk of infection.)  Wear special hair covers, masks, gowns and gloves during surgery.  Give antibiotic medicine, if prescribed. Not all surgeries need antibiotics.  What to bring on the day of surgery  Photo ID and insurance card  Copy of your health care directive, if you have one  Glasses and hearing aids (bring cases)  You can't wear contacts during surgery  Inhaler and eye  drops, if you use them (tell us about these when you arrive)  CPAP machine or breathing device, if you use them  A few personal items, if spending the night  If you have . . .  A pacemaker, ICD (cardiac defibrillator) or other implant: Bring the ID card.  An implanted stimulator: Bring the remote control.  A legal guardian: Bring a copy of the certified (court-stamped) guardianship papers.  Please remove any jewelry, including body piercings. Leave jewelry and other valuables at home.  If you're going home the day of surgery  You must have a responsible adult drive you home. They should stay with you overnight as well.  If you don't have someone to stay with you, and you aren't safe to go home alone, we may keep you overnight. Insurance often won't pay for this.  After surgery  If it's hard to control your pain or you need more pain medicine, please call your surgeon's office.  Questions?   If you have any questions for your care team, list them here: _________________________________________________________________________________________________________________________________________________________________________ ____________________________________ ____________________________________ ____________________________________    How to Take Your Medication Before Surgery  - HOLD (do not take) your METFORMIN on the morning of surgery.

## 2023-08-10 LAB
ATRIAL RATE - MUSE: 72 BPM
DIASTOLIC BLOOD PRESSURE - MUSE: NORMAL MMHG
INTERPRETATION ECG - MUSE: NORMAL
P AXIS - MUSE: 60 DEGREES
PR INTERVAL - MUSE: 138 MS
QRS DURATION - MUSE: 92 MS
QT - MUSE: 402 MS
QTC - MUSE: 440 MS
R AXIS - MUSE: 25 DEGREES
SYSTOLIC BLOOD PRESSURE - MUSE: NORMAL MMHG
T AXIS - MUSE: 5 DEGREES
VENTRICULAR RATE- MUSE: 72 BPM

## 2023-08-14 ENCOUNTER — TRANSFERRED RECORDS (OUTPATIENT)
Dept: HEALTH INFORMATION MANAGEMENT | Facility: OTHER | Age: 63
End: 2023-08-14
Payer: COMMERCIAL

## 2023-08-16 ENCOUNTER — APPOINTMENT (OUTPATIENT)
Dept: GENERAL RADIOLOGY | Facility: OTHER | Age: 63
End: 2023-08-16
Attending: FAMILY MEDICINE
Payer: COMMERCIAL

## 2023-08-16 ENCOUNTER — HOSPITAL ENCOUNTER (EMERGENCY)
Facility: OTHER | Age: 63
Discharge: HOME | End: 2023-08-17
Attending: FAMILY MEDICINE | Admitting: FAMILY MEDICINE
Payer: COMMERCIAL

## 2023-08-16 DIAGNOSIS — J18.9 POST-OP PNEUMONIA: ICD-10-CM

## 2023-08-16 DIAGNOSIS — J95.89 POST-OP PNEUMONIA: ICD-10-CM

## 2023-08-16 LAB
ANION GAP SERPL CALCULATED.3IONS-SCNC: 12 MMOL/L (ref 7–15)
BASOPHILS # BLD AUTO: 0 10E3/UL (ref 0–0.2)
BASOPHILS NFR BLD AUTO: 0 %
BUN SERPL-MCNC: 14.2 MG/DL (ref 8–23)
CALCIUM SERPL-MCNC: 9 MG/DL (ref 8.8–10.2)
CHLORIDE SERPL-SCNC: 97 MMOL/L (ref 98–107)
CREAT SERPL-MCNC: 0.94 MG/DL (ref 0.51–0.95)
CRP SERPL-MCNC: 93.47 MG/L
DEPRECATED HCO3 PLAS-SCNC: 26 MMOL/L (ref 22–29)
EOSINOPHIL # BLD AUTO: 0 10E3/UL (ref 0–0.7)
EOSINOPHIL NFR BLD AUTO: 0 %
ERYTHROCYTE [DISTWIDTH] IN BLOOD BY AUTOMATED COUNT: 13.6 % (ref 10–15)
GFR SERPL CREATININE-BSD FRML MDRD: 68 ML/MIN/1.73M2
GLUCOSE SERPL-MCNC: 126 MG/DL (ref 70–99)
HCT VFR BLD AUTO: 33.4 % (ref 35–47)
HGB BLD-MCNC: 11 G/DL (ref 11.7–15.7)
IMM GRANULOCYTES # BLD: 0 10E3/UL
IMM GRANULOCYTES NFR BLD: 0 %
LYMPHOCYTES # BLD AUTO: 0.5 10E3/UL (ref 0.8–5.3)
LYMPHOCYTES NFR BLD AUTO: 11 %
MCH RBC QN AUTO: 28.7 PG (ref 26.5–33)
MCHC RBC AUTO-ENTMCNC: 32.9 G/DL (ref 31.5–36.5)
MCV RBC AUTO: 87 FL (ref 78–100)
MONOCYTES # BLD AUTO: 0.6 10E3/UL (ref 0–1.3)
MONOCYTES NFR BLD AUTO: 13 %
NEUTROPHILS # BLD AUTO: 3.7 10E3/UL (ref 1.6–8.3)
NEUTROPHILS NFR BLD AUTO: 76 %
NRBC # BLD AUTO: 0 10E3/UL
NRBC BLD AUTO-RTO: 0 /100
PLATELET # BLD AUTO: 180 10E3/UL (ref 150–450)
POTASSIUM SERPL-SCNC: 3.6 MMOL/L (ref 3.4–5.3)
RBC # BLD AUTO: 3.83 10E6/UL (ref 3.8–5.2)
SODIUM SERPL-SCNC: 135 MMOL/L (ref 136–145)
WBC # BLD AUTO: 4.9 10E3/UL (ref 4–11)

## 2023-08-16 PROCEDURE — 250N000013 HC RX MED GY IP 250 OP 250 PS 637: Performed by: FAMILY MEDICINE

## 2023-08-16 PROCEDURE — 258N000003 HC RX IP 258 OP 636: Performed by: FAMILY MEDICINE

## 2023-08-16 PROCEDURE — 99284 EMERGENCY DEPT VISIT MOD MDM: CPT | Performed by: FAMILY MEDICINE

## 2023-08-16 PROCEDURE — C9803 HOPD COVID-19 SPEC COLLECT: HCPCS

## 2023-08-16 PROCEDURE — 86140 C-REACTIVE PROTEIN: CPT | Performed by: FAMILY MEDICINE

## 2023-08-16 PROCEDURE — 71045 X-RAY EXAM CHEST 1 VIEW: CPT | Mod: TC

## 2023-08-16 PROCEDURE — 80048 BASIC METABOLIC PNL TOTAL CA: CPT | Performed by: FAMILY MEDICINE

## 2023-08-16 PROCEDURE — 85025 COMPLETE CBC W/AUTO DIFF WBC: CPT | Performed by: FAMILY MEDICINE

## 2023-08-16 PROCEDURE — 87637 SARSCOV2&INF A&B&RSV AMP PRB: CPT | Performed by: FAMILY MEDICINE

## 2023-08-16 PROCEDURE — 96360 HYDRATION IV INFUSION INIT: CPT

## 2023-08-16 PROCEDURE — 99284 EMERGENCY DEPT VISIT MOD MDM: CPT | Mod: 25

## 2023-08-16 RX ORDER — HYDROCODONE BITARTRATE AND ACETAMINOPHEN 5; 325 MG/1; MG/1
1-2 TABLET ORAL EVERY 6 HOURS PRN
COMMUNITY
Start: 2023-08-14 | End: 2024-02-25

## 2023-08-16 RX ORDER — HYDROXYZINE PAMOATE 25 MG/1
25 CAPSULE ORAL EVERY 6 HOURS PRN
COMMUNITY
Start: 2023-08-14 | End: 2024-02-25

## 2023-08-16 RX ORDER — KETOROLAC TROMETHAMINE 10 MG/1
1 TABLET, FILM COATED ORAL EVERY 6 HOURS PRN
COMMUNITY
Start: 2023-08-14 | End: 2024-02-25

## 2023-08-16 RX ORDER — ACETAMINOPHEN 500 MG
1000 TABLET ORAL
Status: COMPLETED | OUTPATIENT
Start: 2023-08-16 | End: 2023-08-16

## 2023-08-16 RX ADMIN — ACETAMINOPHEN 1000 MG: 500 TABLET ORAL at 23:21

## 2023-08-16 RX ADMIN — SODIUM CHLORIDE 500 ML: 9 INJECTION, SOLUTION INTRAVENOUS at 23:19

## 2023-08-16 ASSESSMENT — ENCOUNTER SYMPTOMS
SHORTNESS OF BREATH: 1
FEVER: 1

## 2023-08-16 ASSESSMENT — ACTIVITIES OF DAILY LIVING (ADL): ADLS_ACUITY_SCORE: 35

## 2023-08-17 VITALS
BODY MASS INDEX: 32.14 KG/M2 | RESPIRATION RATE: 16 BRPM | WEIGHT: 200 LBS | SYSTOLIC BLOOD PRESSURE: 129 MMHG | HEART RATE: 88 BPM | HEIGHT: 66 IN | DIASTOLIC BLOOD PRESSURE: 76 MMHG | OXYGEN SATURATION: 93 % | TEMPERATURE: 99.4 F

## 2023-08-17 LAB
FLUAV RNA SPEC QL NAA+PROBE: NEGATIVE
FLUBV RNA RESP QL NAA+PROBE: NEGATIVE
HOLD SPECIMEN: NORMAL
L PNEUMO1 AG UR QL IA: NEGATIVE
RSV RNA SPEC NAA+PROBE: NEGATIVE
SARS-COV-2 RNA RESP QL NAA+PROBE: NEGATIVE

## 2023-08-17 PROCEDURE — 87899 AGENT NOS ASSAY W/OPTIC: CPT | Performed by: FAMILY MEDICINE

## 2023-08-17 PROCEDURE — 250N000013 HC RX MED GY IP 250 OP 250 PS 637: Performed by: FAMILY MEDICINE

## 2023-08-17 RX ORDER — LEVOFLOXACIN 750 MG/1
750 TABLET, FILM COATED ORAL DAILY
Qty: 9 TABLET | Refills: 0 | Status: SHIPPED | OUTPATIENT
Start: 2023-08-17 | End: 2023-08-26

## 2023-08-17 RX ORDER — LEVOFLOXACIN 750 MG/1
750 TABLET, FILM COATED ORAL ONCE
Status: COMPLETED | OUTPATIENT
Start: 2023-08-17 | End: 2023-08-17

## 2023-08-17 RX ADMIN — LEVOFLOXACIN 750 MG: 750 TABLET, FILM COATED ORAL at 00:48

## 2023-08-17 NOTE — ED PROVIDER NOTES
History     Chief Complaint   Patient presents with    Fever    Post-op Problem     The history is provided by the patient and a significant other.     Asha Ardon is a 63 year old female here with SOB and fever. She had a right rotator cuff surgery on Monday, three days ago. Today she had fever of 102.7  F at home and noticed lower lung volumes while doing her Incentive Spirometer.  No sick contacts. No history of COPD or smoking. She did have COVID a year ago and is V/B against COVID. No LE edema, no history of DVT or PE. Her shoulder pain has been really well controlled. No urine symptoms.     Allergies:  Allergies   Allergen Reactions    Ancef [Cefazolin Sodium] Rash    Biaxin [Clarithromycin] Rash    Contrast Dye Rash    Iodinated Contrast Media Rash       Problem List:    Patient Active Problem List    Diagnosis Date Noted    Injury of right rotator cuff 07/28/2023     Priority: Medium    Diabetes mellitus, type 2 (H) 04/25/2023     Priority: Medium    Rib fractures 02/04/2023     Priority: Medium    Pneumothorax 02/04/2023     Priority: Medium    At high risk for tick borne illness 06/01/2022     Priority: Medium    HX: breast cancer 10/03/2021     Priority: Medium    Irritable bowel syndrome with constipation 10/03/2021     Priority: Medium    History of back surgery 10/03/2021     Priority: Medium    Diverticulosis 12/21/2011     Priority: Medium        Past Medical History:    Past Medical History:   Diagnosis Date    Back pain 1992    Breast cancer (H) 11/10/1998    Thrombocytopenia (H) 08/17/2010       Past Surgical History:    Past Surgical History:   Procedure Laterality Date    ANESTH,SKIN SURGERY, BACK  1992, 1994, 1998,2004    BREAST BIOPSY, RT/LT  11/10/1998    left breast biopsy    COLONOSCOPY  08/15/2019    HYSTERECTOMY, IMTIAZ  01/01/2000    with BSO    MASTECTOMY, BILATERAL  11/19/1998    ZZC ANESTH,SURG BREAST RECONSTRUCTIVE Bilateral 2004       Family History:    Family History   Problem  "Relation Age of Onset    Valvular heart disease Mother         2018    Heart Disease Father     Dementia Father         vascular    Breast Cancer Sister 29    Breast Cancer Sister         DCIS    Esophageal Cancer Brother     No Known Problems Brother     Breast Cancer Maternal Grandmother        Social History:  Marital Status:  Single [1]  Social History     Tobacco Use    Smoking status: Never    Smokeless tobacco: Never   Vaping Use    Vaping Use: Never used   Substance Use Topics    Alcohol use: Yes     Comment: socially - couple drinks every 3 months    Drug use: No        Medications:    HYDROcodone-acetaminophen (NORCO) 5-325 MG tablet  hydrOXYzine (VISTARIL) 25 MG capsule  ketorolac (TORADOL) 10 MG tablet  levofloxacin (LEVAQUIN) 750 MG tablet  amitriptyline (ELAVIL) 10 MG tablet  metFORMIN (GLUCOPHAGE XR) 500 MG 24 hr tablet      Review of Systems   Constitutional:  Positive for fever.   Respiratory:  Positive for shortness of breath.    All other systems reviewed and are negative.      Physical Exam   BP: 124/73  Pulse: 88  Temp: (!) 102.4  F (39.1  C)  Resp: 16  Height: 167.6 cm (5' 6\")  Weight: 90.7 kg (200 lb)  SpO2: 92 %      Physical Exam  Vitals and nursing note reviewed.   Constitutional:       General: She is not in acute distress.     Appearance: Normal appearance. She is not ill-appearing, toxic-appearing or diaphoretic.   Cardiovascular:      Rate and Rhythm: Normal rate and regular rhythm.      Pulses: Normal pulses.      Heart sounds: Normal heart sounds.   Pulmonary:      Effort: Pulmonary effort is normal. No respiratory distress.      Breath sounds: Normal breath sounds. No stridor. No wheezing, rhonchi or rales.   Chest:      Chest wall: No tenderness.   Abdominal:      General: Bowel sounds are normal.      Palpations: Abdomen is soft.      Tenderness: There is no abdominal tenderness. There is no guarding.   Musculoskeletal:      Right lower leg: No edema.      Left lower leg: No edema. "      Comments: Right shoulder surgical site looks good, no redness, no blood on the dressings.   Skin:     General: Skin is warm and dry.   Neurological:      General: No focal deficit present.      Mental Status: She is alert and oriented to person, place, and time.         Results for orders placed or performed during the hospital encounter of 08/16/23 (from the past 24 hour(s))   CBC with platelets differential    Narrative    The following orders were created for panel order CBC with platelets differential.  Procedure                               Abnormality         Status                     ---------                               -----------         ------                     CBC with platelets and d...[[id number]]  Abnormal            Final result                 Please view results for these tests on the individual orders.   Basic metabolic panel   Result Value Ref Range    Sodium 135 (L) 136 - 145 mmol/L    Potassium 3.6 3.4 - 5.3 mmol/L    Chloride 97 (L) 98 - 107 mmol/L    Carbon Dioxide (CO2) 26 22 - 29 mmol/L    Anion Gap 12 7 - 15 mmol/L    Urea Nitrogen 14.2 8.0 - 23.0 mg/dL    Creatinine 0.94 0.51 - 0.95 mg/dL    Calcium 9.0 8.8 - 10.2 mg/dL    Glucose 126 (H) 70 - 99 mg/dL    GFR Estimate 68 >60 mL/min/1.73m2   CRP inflammation   Result Value Ref Range    CRP Inflammation 93.47 (H) <5.00 mg/L   CBC with platelets and differential   Result Value Ref Range    WBC Count 4.9 4.0 - 11.0 10e3/uL    RBC Count 3.83 3.80 - 5.20 10e6/uL    Hemoglobin 11.0 (L) 11.7 - 15.7 g/dL    Hematocrit 33.4 (L) 35.0 - 47.0 %    MCV 87 78 - 100 fL    MCH 28.7 26.5 - 33.0 pg    MCHC 32.9 31.5 - 36.5 g/dL    RDW 13.6 10.0 - 15.0 %    Platelet Count 180 150 - 450 10e3/uL    % Neutrophils 76 %    % Lymphocytes 11 %    % Monocytes 13 %    % Eosinophils 0 %    % Basophils 0 %    % Immature Granulocytes 0 %    NRBCs per 100 WBC 0 <1 /100    Absolute Neutrophils 3.7 1.6 - 8.3 10e3/uL    Absolute Lymphocytes 0.5 (L) 0.8 - 5.3  10e3/uL    Absolute Monocytes 0.6 0.0 - 1.3 10e3/uL    Absolute Eosinophils 0.0 0.0 - 0.7 10e3/uL    Absolute Basophils 0.0 0.0 - 0.2 10e3/uL    Absolute Immature Granulocytes 0.0 <=0.4 10e3/uL    Absolute NRBCs 0.0 10e3/uL   Extra Tube (Cromwell Draw)    Narrative    The following orders were created for panel order Extra Tube (Cromwell Draw).  Procedure                               Abnormality         Status                     ---------                               -----------         ------                     Extra Blue Top Tube[[id number]]                              Final result               Extra Red Top Tube[[id number]]                               Final result               Extra Green Top (Lithium...[[id number]]                      Final result                 Please view results for these tests on the individual orders.   Extra Blue Top Tube   Result Value Ref Range    Hold Specimen JIC    Extra Red Top Tube   Result Value Ref Range    Hold Specimen JIC    Extra Green Top (Lithium Heparin) ON ICE   Result Value Ref Range    Hold Specimen JIC    Symptomatic Influenza A/B, RSV, & SARS-CoV2 PCR (COVID-19) Nasopharyngeal    Specimen: Nasopharyngeal; Swab   Result Value Ref Range    Influenza A PCR Negative Negative    Influenza B PCR Negative Negative    RSV PCR Negative Negative    SARS CoV2 PCR Negative Negative    Narrative    Testing was performed using the Xpert Xpress CoV2/Flu/RSV Assay on the Cepheid GeneXpert Instrument. This test should be ordered for the detection of SARS-CoV-2, influenza, and RSV viruses in individuals who meet clinical and/or epidemiological criteria. Test performance is unknown in asymptomatic patients. This test is for in vitro diagnostic use under the FDA EUA for laboratories certified under CLIA to perform high or moderate complexity testing. This test has not been FDA cleared or approved. A negative result does not rule out the presence of PCR inhibitors in the specimen or  target RNA in concentration below the limit of detection for the assay. If only one viral target is positive but coinfection with multiple targets is suspected, the sample should be re-tested with another FDA cleared, approved, or authorized test, if coinfection would change clinical management. This test was validated by the [facility]. These laboratories are certified under the Clinical Laboratory Improvement Amendments of 1988 (CLIA-88) as qualified to perform high complexity laboratory testing.   XR Chest Port 1 View    Narrative    PROCEDURE INFORMATION:   Exam: XR Chest   Exam date and time: 8/16/2023 11:29 PM   Age: 63 years old   Clinical indication: Cough and shortness of breath; Prior surgery; Surgery   date: 3-7 days post-operative; Surgery type: Recent right shoulder surgery;   Additional info: Shortness of breath, fever, concern for post-op pneumonia     TECHNIQUE:   Imaging protocol: Radiologic exam of the chest.   Views: 1 view.     COMPARISON:   CR XR CHEST 2 VIEWS 2/14/2023 12:16 PM     FINDINGS:   Lungs: Bibasilar subsegmental opacities. No consolidation.   Pleural spaces: Blunting of the right costophrenic sulcus. No large left   pleural effusion. No pneumothorax.   Heart/Mediastinum: The cardiomediastinal silhouette is normal in size and   contour.    Diaphragm: Eventration of the right hemidiaphragm.   Bones/joints: Cervical hardware is grossly stable on this single frontal view.   Mild osseous degenerative change.       Impression    IMPRESSION:   Bibasilar subsegmental airspace disease, may be accentuated secondary to   eventration of the right hemidiaphragm of indeterminate etiology or   significance and cannot exclude right hemidiaphragm paresis in the appropriate   clinical scenario. Findings are increased compared with 02/14/2023 given   differences in imaging technique. Cannot exclude small right pleural effusion.     THIS DOCUMENT HAS BEEN ELECTRONICALLY SIGNED BY  "RADHA KOTHARI MD       Medications   levofloxacin (LEVAQUIN) tablet 750 mg (has no administration in time range)   0.9% sodium chloride BOLUS (500 mLs Intravenous $New Bag 8/16/23 2803)   acetaminophen (TYLENOL) tablet 1,000 mg (1,000 mg Oral $Given 8/16/23 5387)       Assessments & Plan (with Medical Decision Making)  Asha Ardon is a 63 year old female here with SOB and fever. She had a right rotator cuff surgery on Monday, three days ago. Today she had fever of 102.7  F at home and noticed lower lung volumes while doing her Incentive Spirometer.  No sick contacts. No history of COPD or smoking. She did have COVID a year ago and is V/B against COVID. No LE edema, no history of DVT or PE. Her shoulder pain has been really well controlled. No urine symptoms.  VS in the ED /60   Pulse 88   Temp 99.4  F (37.4  C) (Oral)   Resp 16   Ht 1.676 m (5' 6\")   Wt 90.7 kg (200 lb)   LMP 08/31/2000   SpO2 94%   BMI 32.28 kg/m    Exam shows no focal findings. We gave a bolus of NS and some Tylenol.  Labs show CBC with hgb 11, BMP okay, CRP 95, 4 Plex negative.  Chest xray shows bilateral airspace disease. Given her presentation, VS and xray I think this is post-operative pneumonia.  Given her risk factor of DM type 2 and allergy to cephalosporins we will use Levaquin 750 every day for this.  I spoke with them about this and they want a Rx to [facility].     I have reviewed the nursing notes.    I have reviewed the findings, diagnosis, plan and need for follow up with the patient.  Medical Decision Making  The patient's presentation was of moderate complexity (an acute illness with systemic symptoms).    The patient's evaluation involved:  an assessment requiring an independent historian (see separate area of note for details)  ordering and/or review of 3+ test(s) in this encounter (see separate area of note for details)    The patient's management necessitated moderate risk (prescription drug management including " medications given in the ED).        New Prescriptions    LEVOFLOXACIN (LEVAQUIN) 750 MG TABLET    Take 1 tablet (750 mg) by mouth daily for 9 days       Final diagnoses:   Post-op pneumonia       8/16/2023   [facility] AND [facility], Feliciano Amin MD  08/17/23 0044

## 2023-08-17 NOTE — ED TRIAGE NOTES
"Pt presents for a fever. Pt had rotator cuff repair on Monday, report surgery was extensive, has been having a good post-op recovery until today noting a fever, temp up to 102.7F at home, 102.4F in triage. No meds for fever prior to arrival, last took pain meds at 0500 today. Has noted today some cough with incentive spirometer use, shortness of breath or a sensation like she is not able to get good breaths. Denies chest pain. Cough feels congested or sensation like would cough something out but is not productive. Surgical site remains covered by dressing, plan was to remove tomorrow.     /73   Pulse 88   Temp (!) 102.4  F (39.1  C) (Tympanic)   Resp 16   Ht 1.676 m (5' 6\")   Wt 90.7 kg (200 lb)   LMP 08/31/2000   SpO2 92%   BMI 32.28 kg/m         Triage Assessment       Row Name 08/16/23 1111       Triage Assessment (Adult)    Airway WDL WDL       Respiratory WDL    Respiratory WDL X;cough    Cough Type congested       Skin Circulation/Temperature WDL    Skin Circulation/Temperature WDL WDL       Cardiac WDL    Cardiac WDL WDL       Peripheral/Neurovascular WDL    Peripheral Neurovascular WDL WDL       Cognitive/Neuro/Behavioral WDL    Cognitive/Neuro/Behavioral WDL WDL                    "

## 2023-08-21 ENCOUNTER — ALLIED HEALTH/NURSE VISIT (OUTPATIENT)
Dept: EDUCATION SERVICES | Facility: OTHER | Age: 63
End: 2023-08-21
Attending: FAMILY MEDICINE
Payer: COMMERCIAL

## 2023-08-21 DIAGNOSIS — E11.9 TYPE 2 DIABETES MELLITUS WITHOUT COMPLICATION, WITHOUT LONG-TERM CURRENT USE OF INSULIN (H): Primary | ICD-10-CM

## 2023-08-21 PROCEDURE — G0108 DIAB MANAGE TRN  PER INDIV: HCPCS | Performed by: REGISTERED NURSE

## 2023-08-21 NOTE — PROGRESS NOTES
Diabetes Self-Management Education & Support    Presents for:      Type of Service: In Person Visit    Assessment Type:   ASSESSMENT:  Follow-up Diabetes Education     SUBJECTIVE:  Asha Ardon is an 63 year old female who presents for evaluation and treatment of Type 2 diabetes mellitus.   Current treatment includes diet and oral agent.   Current monitoring regimen: none  Home blood sugar records: Patient is not current SMBG.        Discussed D5 Health Goals and patient has met 5 of 5 goals at this time.  (BP less than 140/90, ASA therapy as recommended, Statin therapy as recommended, A1c less than 8%, tobacco free).    Discussed importance of SMBG or using Continuous Glucose Monitoring (CGM).  Patient is willing to try CGM.   Current prerequisites for insurance coverage of CGM:  1.  Patient has diabetes:  YES  2.  Patient is utilizing an insulin pump or administers 1 or more injections of insulin per day:  NO.        Patient meets 1 of 2 required criteria for insurance coverage of CGM.    Patient given FreeStyle Aaron 3 sensor trial kit.  She will use her cell phone for [place].  If she would like to continue with CGM, she will use [employer] Free[employer] E-Voucher program to help lower cost to $75/month for 2 sensors at her local pharmacy.    Reviewed current plan and patient is happy to share A1c has dropped from 7.6 to 7.0%.  Reviewed pathophysiology and progressive nature of T2DM.  Encouraged patient to continue CGM, if cost-effective.    Shared research data and benefits of gut microbiome and the importance of keeping it in shape.          Self-Directed Behavior Goal/s set by patient:  (1) I will wear CGM to help learn glucose trends.      Diabetes recommendations: low cholesterol diet, weight control and daily exercise discussed, home glucose monitoring taught, technique demonstrated, glycohemoglobin monitoring discussed, and long term diabetic complications discussed        Patient's most recent   Lab Results    Component Value Date    A1C 7.0 07/28/2023     is not meeting goal of <7.0    Diabetes knowledge and skills assessment:   Patient is knowledgeable in diabetes management concepts related to: Healthy Eating, Being Active, and Reducing Risks    Continue education with the following diabetes management concepts: Monitoring and Healthy Coping    Based on learning assessment above, most appropriate setting for further diabetes education would be: Group class or Individual setting.      PLAN          Medication recommendations:continue current medication regimen unchanged  Patient will test blood glucose as above or as previously directed  Patient will follow meal plan, practice carbohydrate counting and label reading.  Patient encouraged to develop physical activity plan or continue with current plan  Patient will follow up with provider as directed by PCP  Topics to cover at upcoming visits: Monitoring, Problem Solving, and Healthy Coping    Follow-up: TBD per patient schedule.     See Care Plan for co-developed, patient-state behavior change goals.  AVS provided for patient today.    Education Materials Provided:  D5 Health Goals, Planning Healthy Meals, FreeStyle Aaron 3 trial kit with instructions.      SUBJECTIVE/OBJECTIVE:  Diabetes education in the past 24mo: Yes  Diabetes type: Type 2  Disease course: Improving  How confident are you filling out medical forms by yourself:: Extremely  Cultural Influences/Ethnic Background:  Not  or       Diabetes Symptoms & Complications:  Fatigue: No  Neuropathy: No  Polydipsia: No  Polyphagia: Yes  Polyuria: No  Visual change: No  Slow healing wounds: No  Autonomic neuropathy: No  CVA: No  Heart disease: No  Nephropathy: No  Peripheral neuropathy: No  Peripheral Vascular Disease: No  Retinopathy: No  Sexual dysfunction: No    Patient Problem List and Family Medical History reviewed for relevant medical history, current medical status, and diabetes risk  "factors.    Vitals:  LMP 08/31/2000   Estimated body mass index is 32.28 kg/m  as calculated from the following:    Height as of 8/16/23: 1.676 m (5' 6\").    Weight as of 8/16/23: 90.7 kg (200 lb).   Last 3 BP:   BP Readings from Last 3 Encounters:   08/16/23 129/76   08/08/23 130/80   04/26/23 118/74       History   Smoking Status    Never   Smokeless Tobacco    Never       Labs:  Lab Results   Component Value Date    A1C 7.0 07/28/2023     Lab Results   Component Value Date     08/16/2023     10/01/2021     08/31/2010     Lab Results   Component Value Date     02/14/2023     Direct Measure HDL   Date Value Ref Range Status   02/14/2023 50 >=50 mg/dL Final   ]  GFR Estimate   Date Value Ref Range Status   08/16/2023 68 >60 mL/min/1.73m2 Final   08/31/2010 75 >60 mL/min/1.7m2 Final     GFR Estimate If Black   Date Value Ref Range Status   08/31/2010 >90 >60 mL/min/1.7m2 Final     Lab Results   Component Value Date    CR 0.94 08/16/2023    CR 0.81 08/31/2010     No results found for: MICROALBUMIN    Healthy Eating:  Cultural/Muslim diet restrictions?: No  Meal planning/habits: Avoiding sweets, Low carb  How many times a week on average do you eat food made away from home (restaurant/take-out)?: 3  Meals include: Breakfast, Lunch, Dinner, Evening Snack  Beverages: Water, Tea, Coffee, Diet soda    Being Active:  Days per week of moderate to strenuous exercise (like a brisk walk): (P) 3  On average, minutes per day of exercise at this level: (P) 10  How intense was your typical exercise? : (P) Moderate (like brisk walking)  Exercise Minutes per Week: (P) 30  Barrier to exercise: Other    Monitoring:  Blood Glucose Meter: Other  Times checking blood sugar at home (number): Never      Taking Medications:  Diabetes Medication(s)       Biguanides       metFORMIN (GLUCOPHAGE XR) 500 MG 24 hr tablet    Take 1 tablet (500 mg) by mouth daily (with dinner)            Current Treatments: Diet, Oral " Medication (taken by mouth)      Reducing Risks:  CAD Risks: Diabetes Mellitus, Family history, Obesity  Has dilated eye exam at least once a year?: No  Sees dentist every 6 months?: No  Feet checked by healthcare provider in the last year?: Yes    Healthy Coping:  Informal Support system:: Family, Friends, Partner  Patient Activation Measure Survey Score:       No data to display                  Care Plan and Education Provided:  Care Plan: Diabetes   Updates made by Chary Kirk RN since 8/21/2023 12:00 AM        Problem: Diabetes Self-Management Education Needed to Optimize Self-Care Behaviors         Goal: Reducing Risks - know how to prevent and treat long-term diabetes complications         Task: Provide education on Hemoglobin A1c - goals and relationship to blood glucose levels Completed 8/21/2023   Responsible User: Chary Kirk RN        Task: Provide education on recommendations for heart health - lipid levels and goals, blood pressure and goals, and aspirin therapy, if indicated Completed 8/21/2023   Responsible User: Chary Kirk RN Suzette Childs, RN, BSN, [facility]  8/21/2023 2:50 PM     Time Spent: 60 minutes  Encounter Type: Individual    Any diabetes medication dose changes were made via the CDE Protocol per the patient's primary care provider. A copy of this encounter was shared with the provider.

## 2023-08-21 NOTE — PATIENT INSTRUCTIONS
Diabetes Goals and Reminders    Your A1c test should be done every 3 months.  Your goal is less than 7%.   Your last result is:  Lab Results   Component Value Date    A1C 7.0 07/28/2023       Your LDL cholesterol test should be done at least once a year.  Take a statin, if prescribed by your doctor, based on age and risk factors.  Your last result is:  LDL Cholesterol Calculated   Date Value Ref Range Status   02/14/2023 130 (H) <=100 mg/dL Final       Have blood pressure and weight checked every three months.  Your blood pressure goal is 130/80 or less.  Your last blood pressure reading was:   BP Readings from Last 1 Encounters:   08/16/23 129/76       Your home blood glucose target ranges are:  Fasting or Before meals:   2 hours after a meal: Less than 180      Avoid all tobacco.  Follow your healthy diet and exercise plan.  See the eye doctor every year.  See the dentist every six months.  Have kidney function tested yearly.    Take all medicine as prescribed.  Please let me know if you are having symptoms you don t expect or if you wish to stop any medicine.    Follow Up Plan  Please call or visit Diabetes Education if blood sugars are consistently out of target.  Your future lab plan is:  HgA1c in October 2023.    If you need your cholesterol checked at your next appointment, you should fast 8 to 10 hours before your appointment.  Do not eat or drink anything besides water.  Drink plenty of water and take all your usual medicine.    SUMMARY FOR TODAY'S VISIT    1.  Try the FreeStyle Aaron 3 sensor to monitor glucose for 14 days and if you would like, please call for a prescription and then contact [employer] for the e-voucher to help lower cost.    2.  Discussed carbohydrate counting using the Plate Method for portion control.  Reviewed balanced diet, food groups and incorporating variety, including fruits/vegetables/whole grains/lean protein/nuts and seeds into meals.      A key strategy in this  plan is, along with medication need, to participate in low to moderate physical activity, such as walking, working up to a minimum of 30 minutes most days, as tolerated.         3.  Discussed D5 Health Goals.  You have met 5 of 5 goals at this time.  (BP less than 140/90, Statin therapy as recommended, A1c less than 8%, tobacco free, Aspirin therapy as recommended).      Achieving the five treatment goals that make up the D5 not only reduces your risk for serious cardiovascular problems like heart attack and stroke, it puts you on a path to better health!  See www.theD5.org for more information.    4.  Treatment options:  Discussed gut microbiome and importance to keep it rich.  Here are some ideas to help keep it in shape:  coffee, laughter, probiotics, fermented foods, and exercise, diet (vegetarian/vegan is helpful).    5.  Please follow-up for continued diabetes education, as needed.       Chary Kirk RN, BSN, [facility]  8/21/2023 3:00 PM

## 2023-08-21 NOTE — PROGRESS NOTES
08/08/23 0500   Appointment Info   Signing clinician's name / credentials Cristina Bee, PT   Visits Used 2   Medical Diagnosis S46.001A (ICD-10-CM) - Rotator cuff injury, right, initial encounter   PT Tx Diagnosis R rotator cuff injury   Progress Note/Certification   Onset of illness/injury or Date of Surgery 02/04/23   Therapy Frequency 1x week   Predicted Duration 4 weeks   GOALS   PT Goals 2;3   PT Goal 1   Goal Identifier ROM   Goal Description Patient will achieve neutral pain-free ROM into flexion 90 degrees; abduciton 90 degrees and IR/ER with elbow at side of body to maintain functional arm prior to eventual surgery   Goal Progress not assessed; patient has rotator cuff repair on 8/14/2023   Target Date 08/25/23   PT Goal 2   Goal Identifier strength   Goal Description Patient will increase strength of scap stabilizer to 4/5 to increase stability for shoulder surgery wtihin 4 weeks   Goal Progress not assessed; patient has rotator cuff repair on 8/14/2023   Target Date 08/25/23   PT Goal 3   Goal Identifier HEP   Goal Description Patient will be independent with HEP and understanding post-hab exercises   Goal Progress patient given post-op exercises and demonstrates understanding   Target Date 08/11/23   Date Met 08/08/23   Subjective Report   Subjective Report Patient scheduled for surgery 8/14. Continued education on post-op expectations provided.   Treatment Interventions (PT)   Interventions Therapeutic Procedure/Exercise   Therapeutic Procedure/Exercise   Therapeutic Procedures: strength, endurance, ROM, flexibillity minutes (99224) 45   Ther Proc 1 pre-hab exercises: scap squeeze x10 reps, AAROM shoulder flexion with wand 20 reps, shoulder press with wand x10 reps; ER AAROM with wand x10 reps wiht elbow at side; shoulder rolls x10 reps backwards only; forward flexed position (cannot tolerate prone) -- T's and tricep kick backs x10 reps-- post-hab exercises: pendulums all directions (education on  relaxation techniques to allow for pendulum exercises;  squeeze, wrist flex/ext, wrist radial/ulnar deviation, elbow flex/ext   Skilled Intervention education on pre-hab/post-hab exercises and handouts provided; cues for exercises to be performed within painfree ROM   Patient Response/Progress no increased pain -- education on calling to set up post-op appts and get new orders for post-op therapy   Education   Learner/Method Patient;Listening;Reading;Demonstration;Pictures/Video   Plan   Home program pre-hab exercises: scap squeeze, AAROM shoulder fleixon with wand, shoulder press with wand; ER AAROM with wand; shoulder rolls -- post-hab exercises: pendulums all directions;  squeeze, wrist flex/ext, wrist radial/ulnar deviation, elbow flex/ext   Plan for next session discharge from pre-hab at this time   Total Session Time   Timed Code Treatment Minutes 45   Total Treatment Time (sum of timed and untimed services) 45         DISCHARGE  Reason for Discharge: Patient schedules for rotator cuff repair on 8/14/2023    Equipment Issued: none    Discharge Plan: Patient to continue home program.  Post-op instructions from surgeon    Referring Provider:  MD Cristina Apple, PT

## 2023-08-31 ENCOUNTER — TRANSFERRED RECORDS (OUTPATIENT)
Dept: HEALTH INFORMATION MANAGEMENT | Facility: OTHER | Age: 63
End: 2023-08-31
Payer: COMMERCIAL

## 2023-09-11 ENCOUNTER — THERAPY VISIT (OUTPATIENT)
Dept: PHYSICAL THERAPY | Facility: OTHER | Age: 63
End: 2023-09-11
Attending: ORTHOPAEDIC SURGERY
Payer: COMMERCIAL

## 2023-09-11 DIAGNOSIS — Z98.890 S/P RIGHT ROTATOR CUFF REPAIR: Primary | ICD-10-CM

## 2023-09-11 PROCEDURE — 97110 THERAPEUTIC EXERCISES: CPT | Mod: GP,PO

## 2023-09-11 PROCEDURE — 97161 PT EVAL LOW COMPLEX 20 MIN: CPT | Mod: GP,PO

## 2023-09-11 NOTE — PROGRESS NOTES
PHYSICAL THERAPY EVALUATION  Type of Visit: Evaluation    See electronic medical record for Abuse and Falls Screening details.    Subjective       Presenting condition or subjective complaint:    Date of onset: 08/14/23    Relevant medical history:     Dates & types of surgery:      Prior diagnostic imaging/testing results:       Prior therapy history for the same diagnosis, illness or injury:        Prior Level of Function  No functional limitations reported prior to surgery    Living Environment  Social support:     Type of home:     Stairs to enter the home:         Ramp:     Stairs inside the home:         Help at home:    Equipment owned:       Employment:      Hobbies/Interests:      Patient goals for therapy:           Objective   SHOULDER EVALUATION  PAIN:   Lowest pain: 0/10  Highest pain: 3-4/10    INTEGUMENTARY (edema, incisions): Patient reports continued healing  POSTURE: Rounded shoulder posture in sitting    ROM:   Right shoulder flexion observed to 90 degrees flexion during pendulum exercise.    STRENGTH: Not indicated due to current postop status      Assessment & Plan   CLINICAL IMPRESSIONS  Medical Diagnosis: Status post right shoulder arthroscopy with rotator cuff repair, AC joint resection, extensive debridement, mini open subpectoralis biceps transplantation    Treatment Diagnosis: Impaired range of motion, postural dysfunction, weakness,   Impression/Assessment: Patient is a 63 year old female with right shoulder complaints.  The following significant findings have been identified: Pain, Decreased ROM/flexibility, Decreased strength, Impaired muscle performance, and Decreased activity tolerance. These impairments interfere with their ability to perform self care tasks, recreational activities, and household chores as compared to previous level of function.     Clinical Decision Making (Complexity):  Clinical Presentation: Stable/Uncomplicated  Clinical Presentation Rationale: based on medical  and personal factors listed in PT evaluation  Clinical Decision Making (Complexity): Low complexity    PLAN OF CARE  Treatment Interventions:  Modalities: Vasoneumatic Device  Interventions: Neuromuscular Re-education, Therapeutic Exercise    Long Term Goals     PT Goal 1  Goal Identifier: Right shoulder pain  Goal Description: Patient will report the ability to perform ADL activities such as combing hair without pain  Rationale: to maximize safety and independence with performance of ADLs and functional tasks;to maximize safety and independence within the home;to maximize safety and independence with self cares  Target Date: 11/22/23  PT Goal 2  Goal Identifier: Range of motion  Goal Description: Patient will demonstrate right shoulder flexion at 140 degrees or greater to allow to reaching to an overhead shelf without compensation or difficulty.  Rationale: to maximize safety and independence within the home;to maximize safety and independence with performance of ADLs and functional tasks;to maximize safety and independence within the community  Target Date: 12/06/23  PT Goal 3  Goal Identifier: Weakness  Goal Description: Patient will demonstrate right shoulder strength at 4/5 grade or greater to allow lifting a 1 pound object to an overhead shelf without difficulty or compensation.  Rationale: to maximize safety and independence with performance of ADLs and functional tasks;to maximize safety and independence within the home;to maximize safety and independence within the community  Target Date: 12/20/23      Frequency of Treatment: 12 visits  Duration of Treatment: 12 weeks      Education Assessment:   Learner/Method: Patient;Listening;Reading;Demonstration;Pictures/Video;No Barriers to Learning    Risks and benefits of evaluation/treatment have been explained.   Patient/Family/caregiver agrees with Plan of Care.     Evaluation Time:     PT Eval, Low Complexity Minutes (04474): 15    Signing Clinician: Floyd  Keaton, PT

## 2023-09-26 ENCOUNTER — THERAPY VISIT (OUTPATIENT)
Dept: PHYSICAL THERAPY | Facility: OTHER | Age: 63
End: 2023-09-26
Attending: ORTHOPAEDIC SURGERY
Payer: COMMERCIAL

## 2023-09-26 DIAGNOSIS — Z98.890 S/P RIGHT ROTATOR CUFF REPAIR: Primary | ICD-10-CM

## 2023-09-26 PROCEDURE — 97110 THERAPEUTIC EXERCISES: CPT | Mod: GP,PO

## 2023-10-09 ENCOUNTER — THERAPY VISIT (OUTPATIENT)
Dept: PHYSICAL THERAPY | Facility: OTHER | Age: 63
End: 2023-10-09
Attending: ORTHOPAEDIC SURGERY
Payer: COMMERCIAL

## 2023-10-09 DIAGNOSIS — Z98.890 S/P RIGHT ROTATOR CUFF REPAIR: Primary | ICD-10-CM

## 2023-10-09 PROCEDURE — 97110 THERAPEUTIC EXERCISES: CPT | Mod: GP,PO

## 2023-10-25 ENCOUNTER — THERAPY VISIT (OUTPATIENT)
Dept: PHYSICAL THERAPY | Facility: OTHER | Age: 63
End: 2023-10-25
Payer: COMMERCIAL

## 2023-10-25 DIAGNOSIS — Z98.890 S/P RIGHT ROTATOR CUFF REPAIR: Primary | ICD-10-CM

## 2023-10-25 PROCEDURE — 97110 THERAPEUTIC EXERCISES: CPT | Mod: GP,PO

## 2023-11-21 ENCOUNTER — THERAPY VISIT (OUTPATIENT)
Dept: PHYSICAL THERAPY | Facility: OTHER | Age: 63
End: 2023-11-21
Payer: COMMERCIAL

## 2023-11-21 DIAGNOSIS — Z98.890 S/P RIGHT ROTATOR CUFF REPAIR: Primary | ICD-10-CM

## 2023-11-21 PROCEDURE — 97110 THERAPEUTIC EXERCISES: CPT | Mod: GP,PO

## 2023-12-12 ENCOUNTER — OFFICE VISIT (OUTPATIENT)
Dept: INTERNAL MEDICINE | Facility: OTHER | Age: 63
End: 2023-12-12
Attending: INTERNAL MEDICINE
Payer: COMMERCIAL

## 2023-12-12 ENCOUNTER — NURSE TRIAGE (OUTPATIENT)
Dept: FAMILY MEDICINE | Facility: OTHER | Age: 63
End: 2023-12-12
Payer: COMMERCIAL

## 2023-12-12 VITALS
BODY MASS INDEX: 32.14 KG/M2 | DIASTOLIC BLOOD PRESSURE: 74 MMHG | WEIGHT: 200 LBS | HEART RATE: 85 BPM | OXYGEN SATURATION: 99 % | RESPIRATION RATE: 16 BRPM | TEMPERATURE: 97.4 F | SYSTOLIC BLOOD PRESSURE: 136 MMHG | HEIGHT: 66 IN

## 2023-12-12 DIAGNOSIS — R53.81 MALAISE AND FATIGUE: ICD-10-CM

## 2023-12-12 DIAGNOSIS — E11.9 TYPE 2 DIABETES MELLITUS WITHOUT COMPLICATION, WITHOUT LONG-TERM CURRENT USE OF INSULIN (H): ICD-10-CM

## 2023-12-12 DIAGNOSIS — R53.83 MALAISE AND FATIGUE: ICD-10-CM

## 2023-12-12 DIAGNOSIS — M79.10 MYALGIA: ICD-10-CM

## 2023-12-12 DIAGNOSIS — Z91.89 AT HIGH RISK FOR TICK BORNE ILLNESS: Primary | ICD-10-CM

## 2023-12-12 LAB
ALBUMIN SERPL BCG-MCNC: 4.4 G/DL (ref 3.5–5.2)
ALP SERPL-CCNC: 104 U/L (ref 40–150)
ALT SERPL W P-5'-P-CCNC: 20 U/L (ref 0–50)
ANION GAP SERPL CALCULATED.3IONS-SCNC: 10 MMOL/L (ref 7–15)
AST SERPL W P-5'-P-CCNC: 22 U/L (ref 0–45)
BASOPHILS # BLD AUTO: 0 10E3/UL (ref 0–0.2)
BASOPHILS NFR BLD AUTO: 1 %
BILIRUB SERPL-MCNC: 0.3 MG/DL
BUN SERPL-MCNC: 12.6 MG/DL (ref 8–23)
CALCIUM SERPL-MCNC: 9.9 MG/DL (ref 8.8–10.2)
CHLORIDE SERPL-SCNC: 101 MMOL/L (ref 98–107)
CREAT SERPL-MCNC: 0.84 MG/DL (ref 0.51–0.95)
DEPRECATED HCO3 PLAS-SCNC: 28 MMOL/L (ref 22–29)
EGFRCR SERPLBLD CKD-EPI 2021: 78 ML/MIN/1.73M2
EOSINOPHIL # BLD AUTO: 0.1 10E3/UL (ref 0–0.7)
EOSINOPHIL NFR BLD AUTO: 3 %
ERYTHROCYTE [DISTWIDTH] IN BLOOD BY AUTOMATED COUNT: 13.4 % (ref 10–15)
GLUCOSE SERPL-MCNC: 116 MG/DL (ref 70–99)
HBA1C MFR BLD: 7.2 % (ref 4–6.2)
HCT VFR BLD AUTO: 41.2 % (ref 35–47)
HGB BLD-MCNC: 13.6 G/DL (ref 11.7–15.7)
IMM GRANULOCYTES # BLD: 0 10E3/UL
IMM GRANULOCYTES NFR BLD: 0 %
LYMPHOCYTES # BLD AUTO: 1.6 10E3/UL (ref 0.8–5.3)
LYMPHOCYTES NFR BLD AUTO: 31 %
MCH RBC QN AUTO: 28.5 PG (ref 26.5–33)
MCHC RBC AUTO-ENTMCNC: 33 G/DL (ref 31.5–36.5)
MCV RBC AUTO: 86 FL (ref 78–100)
MONOCYTES # BLD AUTO: 0.5 10E3/UL (ref 0–1.3)
MONOCYTES NFR BLD AUTO: 9 %
NEUTROPHILS # BLD AUTO: 3 10E3/UL (ref 1.6–8.3)
NEUTROPHILS NFR BLD AUTO: 56 %
NRBC # BLD AUTO: 0 10E3/UL
NRBC BLD AUTO-RTO: 0 /100
PLATELET # BLD AUTO: 231 10E3/UL (ref 150–450)
POTASSIUM SERPL-SCNC: 3.9 MMOL/L (ref 3.4–5.3)
PROT SERPL-MCNC: 7.4 G/DL (ref 6.4–8.3)
RBC # BLD AUTO: 4.78 10E6/UL (ref 3.8–5.2)
SODIUM SERPL-SCNC: 139 MMOL/L (ref 135–145)
WBC # BLD AUTO: 5.3 10E3/UL (ref 4–11)

## 2023-12-12 PROCEDURE — 87798 DETECT AGENT NOS DNA AMP: CPT | Mod: ZL | Performed by: INTERNAL MEDICINE

## 2023-12-12 PROCEDURE — 87484 EHRLICHA CHAFFEENSIS AMP PRB: CPT | Mod: ZL | Performed by: INTERNAL MEDICINE

## 2023-12-12 PROCEDURE — 99214 OFFICE O/P EST MOD 30 MIN: CPT | Performed by: INTERNAL MEDICINE

## 2023-12-12 PROCEDURE — 36415 COLL VENOUS BLD VENIPUNCTURE: CPT | Mod: ZL | Performed by: INTERNAL MEDICINE

## 2023-12-12 PROCEDURE — 85025 COMPLETE CBC W/AUTO DIFF WBC: CPT | Mod: ZL | Performed by: INTERNAL MEDICINE

## 2023-12-12 PROCEDURE — 83036 HEMOGLOBIN GLYCOSYLATED A1C: CPT | Mod: ZL | Performed by: INTERNAL MEDICINE

## 2023-12-12 PROCEDURE — 86618 LYME DISEASE ANTIBODY: CPT | Mod: ZL | Performed by: INTERNAL MEDICINE

## 2023-12-12 PROCEDURE — 82374 ASSAY BLOOD CARBON DIOXIDE: CPT | Mod: ZL | Performed by: INTERNAL MEDICINE

## 2023-12-12 RX ORDER — DOXYCYCLINE 100 MG/1
100 CAPSULE ORAL 2 TIMES DAILY
Qty: 28 CAPSULE | Refills: 3 | Status: SHIPPED | OUTPATIENT
Start: 2023-12-12 | End: 2023-12-26

## 2023-12-12 ASSESSMENT — ENCOUNTER SYMPTOMS
FATIGUE: 1
MYALGIAS: 1

## 2023-12-12 ASSESSMENT — PAIN SCALES - GENERAL: PAINLEVEL: NO PAIN (0)

## 2023-12-12 NOTE — PATIENT INSTRUCTIONS
Likely tick infection....     Labs today.     Start doxycycline 100 mg tablet -- take twice daily (AM and PM) after meals.   Avoid dairy products within 1 to 2 hours (before or after) taking antibiotic -- as it will grab the antibiotic and not let it work.   -- You can eat whatever you want for lunch.   -- Take calcium or magnesium or multivitamin supplements at lunchtime while taking doxycycline.       If symptoms resolved after 2 weeks.... okay to be done. If symptoms restart, take additional 2 weeks of Doxycycline.     If Babesiosis... will need different medications.       Return as needed for follow-up for new / worsening symptoms.    Clinic : [phone]  Appointment line: [phone]

## 2023-12-12 NOTE — NURSING NOTE
"Chief Complaint   Patient presents with    fatigue, muscle aches, tick bite maybe 6 to 8 weeks       Initial /74 (BP Location: Right arm, Patient Position: Sitting, Cuff Size: Adult Regular)   Pulse 85   Temp 97.4  F (36.3  C) (Temporal)   Resp 16   Ht 1.676 m (5' 6\")   Wt 90.7 kg (200 lb)   LMP 08/31/2000 (Approximate)   SpO2 99%   Breastfeeding No   BMI 32.28 kg/m   Estimated body mass index is 32.28 kg/m  as calculated from the following:    Height as of this encounter: 1.676 m (5' 6\").    Weight as of this encounter: 90.7 kg (200 lb).  Medication Review: complete    The next two questions are to help us understand your food security.  If you are feeling you need any assistance in this area, we have resources available to support you today.          12/12/2023   SDOH- Food Insecurity   Within the past 12 months, did you worry that your food would run out before you got money to buy more? N   Within the past 12 months, did the food you bought just not last and you didn t have money to get more? N         Health Care Directive:  Patient does not have a Health Care Directive or Living Will: Discussed advance care planning with patient; however, patient declined at this time.    Farooq Sweeney      "

## 2023-12-12 NOTE — TELEPHONE ENCOUNTER
Patient calling today and states that she has been so tired for the last 2 weeks and hurts all over.    States had a deer tick bite about 8 weeks ago and just picked a scab off the site last night.  States the bites take forever to heal.  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   States she would like to be seen to make sure she doesn't have a tick borne disease.  No available appointments in clinic patient will present to rapid clinic.  Melissa Armas RN on 12/12/2023 at 10:17 AM      Reason for Disposition   Patient wants to be seen    Protocols used: Tick Bite-A-OH

## 2023-12-12 NOTE — PROGRESS NOTES
Assessment & Plan     ICD-10-CM    1. At high risk for tick borne illness  Z91.89 doxycycline hyclate (VIBRAMYCIN) 100 MG capsule     CBC and Differential     Comprehensive Metabolic Panel     Ehrlichia Anaplasma Sp by PCR     Babesia Species by PCR     Lyme Disease Total Abs Bld with Reflex to Confirm CLIA     Babesia Species by PCR     CBC and Differential     Comprehensive Metabolic Panel     Ehrlichia Anaplasma Sp by PCR     Lyme Disease Total Abs Bld with Reflex to Confirm CLIA      2. Myalgia  M79.10 doxycycline hyclate (VIBRAMYCIN) 100 MG capsule     CBC and Differential     Comprehensive Metabolic Panel     Ehrlichia Anaplasma Sp by PCR     Babesia Species by PCR     Lyme Disease Total Abs Bld with Reflex to Confirm CLIA     Babesia Species by PCR     CBC and Differential     Comprehensive Metabolic Panel     Ehrlichia Anaplasma Sp by PCR     Lyme Disease Total Abs Bld with Reflex to Confirm CLIA      3. Malaise and fatigue  R53.81 doxycycline hyclate (VIBRAMYCIN) 100 MG capsule    R53.83 CBC and Differential     Comprehensive Metabolic Panel     Ehrlichia Anaplasma Sp by PCR     Babesia Species by PCR     Lyme Disease Total Abs Bld with Reflex to Confirm CLIA     Babesia Species by PCR     CBC and Differential     Comprehensive Metabolic Panel     Ehrlichia Anaplasma Sp by PCR     Lyme Disease Total Abs Bld with Reflex to Confirm CLIA      4. Type 2 diabetes mellitus without complication, without long-term current use of insulin (H)  E11.9 Hemoglobin A1c     Hemoglobin A1c        Patient presents for evaluation of myalgias, fatigue, pelvic and shoulder girdle aches and pains, or other excessive fatigue for the past 3 to 4 weeks.  She has slight dogs and spends a great deal of time outdoors with them.  She also pulled a couple deer ticks off of her other animals recently and is currently treating one of her slight dogs for Lyme disease.  She had a known deer tick bite about 8 weeks ago.  She totally forgot  that she had prophylactic doxycycline at home to take 2 capsules after tick bite.    Has had anaplasmosis in the past.  She would like to get lab work collected today including tick labs and some additional comprehensive labs and her hemoglobin A1c.    At this point would recommend going ahead with treatment with doxycycline 100 mg twice daily.  Lab work collected.  If she does test positive for babesiosis we will need to change medications or add additional treatment.    Type 2 Diabetes Mellitus, without complications.  Blood sugar control has been good with minimal hyperglycemia. Doing well with diet, oral agents, and exercise.  Medication list reviewed/updated. Refills completed as needed.    Complicating factors include but are not limited to: hyperlipidemia.     Recent Labs   Lab Test 12/12/23  1255 08/16/23  2303 08/08/23  1624 07/28/23  0854 04/26/23  1502 02/14/23  1407 10/01/21  1509 10/01/21  1509   A1C 7.2*  --   --  7.0*  --  7.6*  --   --    LDL  --   --   --   --   --  130*  --  131*   HDL  --   --   --   --   --  50  --  43   TRIG  --   --   --   --   --  133  --  178*   ALT 20  --   --   --  26  --   --  19   CR 0.84 0.94   < >  --  0.92 0.95  --  1.06   GFRESTIMATED 78 68   < >  --  70 67  --  57*   POTASSIUM 3.9 3.6   < >  --  3.9 4.5   < > 3.7   TSH  --   --   --   --   --   --   --  3.02   WBC 5.3 4.9  --   --   --   --   --  6.2   HGB 13.6 11.0*   < >  --   --   --   --  12.9    180  --   --   --   --   --  231   ALBUMIN 4.4  --   --   --  4.3  --   --  4.2    < > = values in this interval not displayed.     Hemoglobin A1c is well-controlled.  ALT and creatinine are normal.  Potassium normal.  CBC normal.  Albumin normal.      Return for follow-up as needed for new or worsening symptoms, Appointments: [phone].    Domenic Villarreal MD  [facility] is a 63 year old, presenting for the following health issues:  fatigue, muscle aches, tick bite  "maybe 6 to 8 weeks        12/12/2023    12:19 PM   Additional Questions   Roomed by Farooq López LPN       History of Present Illness       Reason for visit:  Fatigue, muscle aches, tick bit  Symptom onset:  More than a month  Symptoms include:  Tires, body aches  Symptom intensity:  Moderate  Symptom progression:  Staying the same  Had these symptoms before:  No    She eats 2-3 servings of fruits and vegetables daily.She consumes 0 sweetened beverage(s) daily.She exercises with enough effort to increase her heart rate 60 or more minutes per day.  She exercises with enough effort to increase her heart rate 7 days per week.   She is taking medications regularly.       Review of Systems   Constitutional:  Positive for fatigue.   Musculoskeletal:  Positive for myalgias.   Skin:  Negative for rash.            Objective    /74 (BP Location: Right arm, Patient Position: Sitting, Cuff Size: Adult Regular)   Pulse 85   Temp 97.4  F (36.3  C) (Temporal)   Resp 16   Ht 1.676 m (5' 6\")   Wt 90.7 kg (200 lb)   LMP 08/31/2000 (Approximate)   SpO2 99%   Breastfeeding No   BMI 32.28 kg/m    Body mass index is 32.28 kg/m .  Physical Exam  Constitutional:       Appearance: Normal appearance.   Cardiovascular:      Rate and Rhythm: Normal rate and regular rhythm.   Pulmonary:      Effort: Pulmonary effort is normal.   Skin:     Findings: No rash.   Neurological:      Mental Status: She is alert. Mental status is at baseline.                          "

## 2023-12-13 ENCOUNTER — THERAPY VISIT (OUTPATIENT)
Dept: PHYSICAL THERAPY | Facility: OTHER | Age: 63
End: 2023-12-13
Payer: COMMERCIAL

## 2023-12-13 DIAGNOSIS — Z98.890 S/P RIGHT ROTATOR CUFF REPAIR: Primary | ICD-10-CM

## 2023-12-13 LAB — B BURGDOR IGG+IGM SER QL: 0.1

## 2023-12-13 PROCEDURE — 97110 THERAPEUTIC EXERCISES: CPT | Mod: GP,PO

## 2023-12-15 LAB
A PHAGOCYTOPH DNA BLD QL NAA+PROBE: NOT DETECTED
E CHAFFEENSIS DNA BLD QL NAA+PROBE: NOT DETECTED
E EWINGII DNA SPEC QL NAA+PROBE: NOT DETECTED
EHRLICHIA DNA SPEC QL NAA+PROBE: NOT DETECTED

## 2023-12-16 LAB
B MICROTI DNA BLD QL NAA+PROBE: NOT DETECTED
BABESIA DNA BLD QL NAA+PROBE: NOT DETECTED

## 2023-12-27 ENCOUNTER — THERAPY VISIT (OUTPATIENT)
Dept: PHYSICAL THERAPY | Facility: OTHER | Age: 63
End: 2023-12-27
Payer: COMMERCIAL

## 2023-12-27 DIAGNOSIS — Z98.890 S/P RIGHT ROTATOR CUFF REPAIR: Primary | ICD-10-CM

## 2023-12-27 PROCEDURE — 97110 THERAPEUTIC EXERCISES: CPT | Mod: GP,PO

## 2023-12-27 NOTE — PROGRESS NOTES
12/27/23 0859   Appointment Info   Signing clinician's name / credentials Floyd Pugh, PT, OCS   Visits Used 7   Medical Diagnosis Status post right shoulder arthroscopy with rotator cuff repair, AC joint resection, extensive debridement, mini open subpectoralis biceps transplantation   PT Tx Diagnosis Impaired range of motion, postural dysfunction, weakness,   Progress Note/Certification   Onset of illness/injury or Date of Surgery 08/14/23   Therapy Frequency 2 visits   Predicted Duration 6 weeks   PT Goal 1   Goal Identifier Right shoulder pain   Goal Description Patient will report the ability to perform ADL activities such as combing hair without pain   Rationale to maximize safety and independence with performance of ADLs and functional tasks;to maximize safety and independence within the home;to maximize safety and independence with self cares   Goal Progress Goal met   Target Date 11/22/23   Date Met 12/13/23   PT Goal 2   Goal Identifier Range of motion   Goal Description Patient will demonstrate right shoulder flexion at 140 degrees or greater to allow to reaching to an overhead shelf without compensation or difficulty.   Rationale to maximize safety and independence within the home;to maximize safety and independence with performance of ADLs and functional tasks;to maximize safety and independence within the community   Goal Progress Progressing towards goal.  Patient demonstrates 120 degrees of active shoulder flexion   Target Date 02/07/24   PT Goal 3   Goal Identifier Weakness   Goal Description Patient will demonstrate right shoulder strength at 4/5 grade or greater to allow lifting a 1 pound object to an overhead shelf without difficulty or compensation.   Rationale to maximize safety and independence with performance of ADLs and functional tasks;to maximize safety and independence within the home;to maximize safety and independence within the community   Goal Progress Progressing towards goal.   "Patient is making gradual progress with current phase of the rehabilitation protocol   Target Date 02/07/24   Subjective Report   Subjective Report Patient reports updated home exercise program is going well.  She has been able to progress repetitions without any difficulty at this time.  Patient reports function of the right shoulder continues to improve daily.  She is able to tolerate daily activities with less difficulty.  She continues to be careful following protocol restrictions for activity is much as possible.  Patient plans to schedule a follow-up with Dr. Jorgensen.   Objective Measures   Objective Measures Objective Measure 1;Objective Measure 2   Objective Measure 1   Objective Measure Right shoulder pain   Details 0-1/10 on average.   Objective Measure 2   Objective Measure Right shoulder ROM   Details AROM Gzlhrak=366 degrees    AROM Abduction= 110 degrees.   Treatment Interventions (PT)   Interventions Therapeutic Procedure/Exercise   Therapeutic Procedure/Exercise   Therapeutic Procedures: strength, endurance, ROM, flexibillity minutes (23783) 40   Ther Proc 1 - Details Ceiling Punch 8 ounces 2 x 20.   Supine reverse codmans 8 ounces 2 x 20 reps each motion.  Anterior deltoid isometric with moderate pressure x 10, 5\" hold.    Middle deltoid isometric with moderate pressure x 10, 5\" hold.  Table press x10 reps, 3-second hold.  Isometric abduction x10 reps, 5-second hold.  Side-lying ER 2 x 30.  Bearhug 20 reps, 3-second hold.  Reviewed table circles, prayer stretch and seated ER verbally. Reviewed progression of the 4-5-month phase of the rehabilitation protocol.  Reviewed appropriate activity to level during this phase of recovery.   Skilled Intervention Education, HEP, range of motion   Patient Response/Progress Patient verbalized and demonstrated understanding of updated home exercise program.   Education   Learner/Method Patient;Listening;Reading;Demonstration;Pictures/Video;No Barriers to Learning "   Plan   Plan for next session progress exercises per decelerated rotator cuff repair protocol and as symptoms allow.   Total Session Time   Timed Code Treatment Minutes 40   Total Treatment Time (sum of timed and untimed services) 40         PLAN  Continue therapy per current plan of care. Progress rehab protocol. Anticipate 1-2 additional visits.     Beginning/End Dates of Progress Note Reporting Period:    9/11/23 to 12/27/2023    Referring Provider:  Cedrick Jorgensen

## 2024-02-16 ENCOUNTER — MYC MEDICAL ADVICE (OUTPATIENT)
Dept: FAMILY MEDICINE | Facility: OTHER | Age: 64
End: 2024-02-16
Payer: COMMERCIAL

## 2024-02-28 ENCOUNTER — OFFICE VISIT (OUTPATIENT)
Dept: FAMILY MEDICINE | Facility: OTHER | Age: 64
End: 2024-02-28
Attending: PHYSICIAN ASSISTANT
Payer: COMMERCIAL

## 2024-02-28 VITALS
HEIGHT: 66 IN | WEIGHT: 196 LBS | BODY MASS INDEX: 31.5 KG/M2 | HEART RATE: 76 BPM | DIASTOLIC BLOOD PRESSURE: 70 MMHG | SYSTOLIC BLOOD PRESSURE: 130 MMHG | OXYGEN SATURATION: 96 % | RESPIRATION RATE: 20 BRPM | TEMPERATURE: 97.7 F

## 2024-02-28 DIAGNOSIS — H65.91 OME (OTITIS MEDIA WITH EFFUSION), RIGHT: Primary | ICD-10-CM

## 2024-02-28 PROCEDURE — 99213 OFFICE O/P EST LOW 20 MIN: CPT | Performed by: PHYSICIAN ASSISTANT

## 2024-02-28 ASSESSMENT — PAIN SCALES - GENERAL: PAINLEVEL: NO PAIN (0)

## 2024-02-28 NOTE — PROGRESS NOTES
"  Assessment & Plan       ICD-10-CM    1. OME (otitis media with effusion), right  H65.91 amoxicillin-clavulanate (AUGMENTIN) 875-125 MG tablet        Vital signs stable. Moderate right otitis media. Will treat with Augmentin, as well as, alternating tylenol and ibuprofen every 4-6 hours as needed (if able, daily limits reviewed in AVS and verbally with patient), warm compresses, other symptomatic remedies. Start flonase 1-2 sprays into each nostril daily x 2-4 weeks. Avoid trauma to ear(s) such as Q-tips. If symptoms change or worsen, recommend follow up for reevaluation (high fevers, worsening pain, abnormal drainage or odor from ear, etc.). Discussed if ear infections become increasingly common, as this point, a referral to ENT can be made, typically by PCP. Patient is in agreement and understanding of the above treatment plan. All questions and concerns were addressed and answered to patient's satisfaction. AVS reviewed with patient.     BMI  Estimated body mass index is 31.64 kg/m  as calculated from the following:    Height as of this encounter: 1.676 m (5' 6\").    Weight as of this encounter: 88.9 kg (196 lb).   Weight management plan: Discussed healthy diet and exercise guidelines    See Patient Instructions    Return if symptoms worsen or fail to improve.    Subjective   Asha is a 63 year old, presenting for the following health issues:  Illness (Acute/)      2/28/2024    10:47 AM   Additional Questions   Roomed by christina figueredo lpn     History of Present Illness       Reason for visit:  Ear ache sore throat dry cough plugged ears  Symptom onset:  1-2 weeks ago    She eats 2-3 servings of fruits and vegetables daily.She consumes 1 sweetened beverage(s) daily.She exercises with enough effort to increase her heart rate 10 to 19 minutes per day.  She exercises with enough effort to increase her heart rate 5 days per week. She is missing 1 dose(s) of medications per week.     Asha presents to the clinic with concerns " "of illness x 10 days duration including sore throat, ear pressure, intermittent dry cough, plugged sensation to ears. No fevers or chills. No nausea, vomiting or diarrhea. + fatigue at times. No additional symptoms to report. Using Ibuprofen over the count.     Review of Systems  Constitutional, HEENT, cardiovascular, pulmonary, GI, , musculoskeletal, neuro, skin, endocrine and psych systems are negative, except as otherwise noted.        Objective    /70 (BP Location: Left arm, Patient Position: Sitting, Cuff Size: Adult Regular)   Pulse 76   Temp 97.7  F (36.5  C) (Tympanic)   Resp 20   Ht 1.676 m (5' 6\")   Wt 88.9 kg (196 lb)   LMP 08/31/2000 (Approximate)   SpO2 96%   BMI 31.64 kg/m    Body mass index is 31.64 kg/m .  Physical Exam   GENERAL: alert and no distress  EYES: Eyes grossly normal to inspection, PERRL and conjunctivae and sclerae normal  HENT: normal cephalic/atraumatic, right ear: erythematous and bulging membrane, left ear: normal: no effusions, no erythema, normal landmarks, nose and mouth without ulcers or lesions, oropharynx clear, and oral mucous membranes moist  NECK: no adenopathy, no asymmetry, masses, or scars  RESP: lungs clear to auscultation - no rales, rhonchi or wheezes  CV: regular rate and rhythm, normal S1 S2, no S3 or S4, no murmur, click or rub, no peripheral edema  MS: no gross musculoskeletal defects noted, no edema  SKIN: no suspicious lesions or rashes  PSYCH: mentation appears normal, affect normal/bright  LYMPH: normal ant/post cervical, supraclavicular nodes    Signed Electronically by: Radha Salmeron PA-C    "

## 2024-02-28 NOTE — NURSING NOTE
"Patient presents to the clinic for acute illness, 2 negative covid tests recently.    FOOD SECURITY SCREENING QUESTIONS:    The next two questions are to help us understand your food security.  If you are feeling you need any assistance in this area, we have resources available to support you today.    Hunger Vital Signs:  Within the past 12 months we worried whether our food would run out before we got money to buy more. Never  Within the past 12 months the food we bought just didn't last and we didn't have money to get more. Never    Advance Care Directive on file? no  Advance Care Directive provided to patient? Declined.      Chief Complaint   Patient presents with    Illness     Acute         Initial /70 (BP Location: Left arm, Patient Position: Sitting, Cuff Size: Adult Regular)   Pulse 76   Temp 97.7  F (36.5  C) (Tympanic)   Resp 20   Ht 1.676 m (5' 6\")   Wt 88.9 kg (196 lb)   LMP 08/31/2000 (Approximate)   SpO2 96%   BMI 31.64 kg/m   Estimated body mass index is 31.64 kg/m  as calculated from the following:    Height as of this encounter: 1.676 m (5' 6\").    Weight as of this encounter: 88.9 kg (196 lb).  Medication Reconciliation: complete        Lexy Roberts LPN     "

## 2024-02-28 NOTE — PATIENT INSTRUCTIONS
"You were prescribed an antibiotic, please take into consideration the following information:  - Take entire course of antibiotic even if you start to feel better.  - Antibiotics can cause stomach upset including nausea and diarrhea. Read your bottle or ask the pharmacist if antibiotic can be taken with food to help prevent nausea. If you have symptoms of diarrhea you can take an over-the-counter probiotic and/or increase foods with probiotics such as yogurt, Peridot, sauerkraut.  -Use caution in sunlight as can lead to increased risk of sunburn while on ABX (antibiotics).     Please refer to your AVS for follow up and pain/symptoms management recommendations (I.e.: medications, helpful conservative treatment modalities, appropriate follow up if need to a specialist or family practice, etc.). Please return to urgent care if your symptoms change or worsen.     Discharge instructions:  -If you were prescribed a medication(s), please take this as prescribed/directed  -Monitor your symptoms, if changing/worsening, return to UC/ER or PCP for follow up    - For ear infection. Take entire course of antibiotics to ensure this clears (even if feeling better).  - Flonase (Fluticasone) 1-2 sprays daily for 2-4 weeks  - Tylenol or ibuprofen for pain and fevers.   - Eat yogurt, kefir or take over-the-counter \"probiotic\" at least 2 hours before or after a dose of antibiotic. This will replace good bacteria that may have been lost due to the antibiotic. (This may also help to prevent yeast infections and upset stomach during the course of antibiotic.)  - In the future at onset of congestion: Blow nose or use bulb syringe to keep nasal congestion cleared and use saline nasal spray/flush.  -Alternative ibuprofen and tylenol as needed.   -Rest/relaxation and keeping hydrated with clear liquids (ie: water or gatorade). Using a humidifier may be beneficial as well.     * Recheck with family practice as needed or ER sooner with worsening " or concerns.

## 2024-03-11 ENCOUNTER — OFFICE VISIT (OUTPATIENT)
Dept: FAMILY MEDICINE | Facility: OTHER | Age: 64
End: 2024-03-11
Attending: FAMILY MEDICINE
Payer: COMMERCIAL

## 2024-03-11 VITALS
OXYGEN SATURATION: 96 % | BODY MASS INDEX: 31.82 KG/M2 | HEIGHT: 66 IN | DIASTOLIC BLOOD PRESSURE: 70 MMHG | RESPIRATION RATE: 16 BRPM | SYSTOLIC BLOOD PRESSURE: 126 MMHG | HEART RATE: 71 BPM | TEMPERATURE: 97.6 F | WEIGHT: 198 LBS

## 2024-03-11 DIAGNOSIS — Z98.890 HISTORY OF BACK SURGERY: ICD-10-CM

## 2024-03-11 DIAGNOSIS — E66.811 CLASS 1 OBESITY WITHOUT SERIOUS COMORBIDITY WITH BODY MASS INDEX (BMI) OF 33.0 TO 33.9 IN ADULT, UNSPECIFIED OBESITY TYPE: ICD-10-CM

## 2024-03-11 DIAGNOSIS — E11.9 TYPE 2 DIABETES MELLITUS WITHOUT COMPLICATION, WITHOUT LONG-TERM CURRENT USE OF INSULIN (H): Primary | ICD-10-CM

## 2024-03-11 PROCEDURE — 99214 OFFICE O/P EST MOD 30 MIN: CPT | Performed by: FAMILY MEDICINE

## 2024-03-11 RX ORDER — AMITRIPTYLINE HYDROCHLORIDE 10 MG/1
TABLET ORAL
Qty: 30 TABLET | Refills: 5 | Status: SHIPPED | OUTPATIENT
Start: 2024-03-11

## 2024-03-11 RX ORDER — METFORMIN HCL 500 MG
500 TABLET, EXTENDED RELEASE 24 HR ORAL
Qty: 90 TABLET | Refills: 3 | Status: SHIPPED | OUTPATIENT
Start: 2024-03-11

## 2024-03-11 RX ORDER — ROSUVASTATIN CALCIUM 10 MG/1
5 TABLET, COATED ORAL DAILY
Qty: 45 TABLET | Refills: 4 | Status: SHIPPED | OUTPATIENT
Start: 2024-03-11 | End: 2024-07-10 | Stop reason: SINTOL

## 2024-03-11 ASSESSMENT — PAIN SCALES - GENERAL: PAINLEVEL: MODERATE PAIN (4)

## 2024-03-11 NOTE — PROGRESS NOTES
"    Assessment & Plan       ICD-10-CM    1. Type 2 diabetes mellitus without complication, without long-term current use of insulin (H)  E11.9 metFORMIN (GLUCOPHAGE XR) 500 MG 24 hr tablet     semaglutide (OZEMPIC) 2 MG/3ML pen     rosuvastatin (CRESTOR) 10 MG tablet      2. Class 1 obesity without serious comorbidity with body mass index (BMI) of 33.0 to 33.9 in adult, unspecified obesity type  E66.9     Z68.33       3. History of back surgery  Z98.890 amitriptyline (ELAVIL) 10 MG tablet           Labs recently done reviewed  Continue Glucophage and add ozempic.  No contraindication to use.  Reviewed medication side effects.  If not covered, would increase Glucophage dose.  Start Crestor 5mg at bedtime  Continue to work on diet and activity changes  Continue elavil for chronic back pain.  As this is WC related, she plans on WC follow up visit for this too.  Follow up in 2 months with labs.      PDMP Review         Value Time User    State PDMP site checked  Yes 8/8/2023  4:07 PM Emily Love APRN CNP            Ordering of each unique test  Prescription drug management           No follow-ups on file.    EMERY NINO MD  [facility]    Subjective   Asha Ardon is a 63 year old female  presenting for the following health issues: Nursing Notes:   Suzette Kan LPN  3/11/2024  2:11 PM  Signed  Chief Complaint   Patient presents with    Diabetes      DM check       Initial /70 (BP Location: Right arm, Patient Position: Sitting, Cuff Size: Adult Regular)   Pulse 71   Temp 97.6  F (36.4  C) (Temporal)   Resp 16   Ht 1.676 m (5' 6\")   Wt 89.8 kg (198 lb)   LMP 08/31/2000 (Approximate)   SpO2 96%   BMI 31.96 kg/m   Estimated body mass index is 31.96 kg/m  as calculated from the following:    Height as of this encounter: 1.676 m (5' 6\").    Weight as of this encounter: 89.8 kg (198 lb).    Medication Review: complete    Previous A1C is at goal of <8  Lab Results "   Component Value Date    A1C 7.2 12/12/2023    A1C 7.0 07/28/2023    A1C 7.6 02/14/2023     Urine microalbumin:creatine: 4/26/24  Foot exam : 4/26/24  Eye exam:   Making eye doctor appointment  Tobacco User : no  Patient is not on a daily aspirin  Patient is not on a Statin.  Blood pressure today of:     BP Readings from Last 1 Encounters:   03/11/24 126/70      is at the goal of <139/89 for diabetics.    Suzette Kan LPN on 3/11/2024 at 2:09 PM        The next two questions are to help us understand your food security.  If you are feeling you need any assistance in this area, we have resources available to support you today.          2/28/2024   SDOH- Food Insecurity   Within the past 12 months, did you worry that your food would run out before you got money to buy more? N   Within the past 12 months, did the food you bought just not last and you didn t have money to get more? N         Health Care Directive:  Patient does not have a Health Care Directive or Living Will: Patient states has Advance Directive and will bring in a copy to clinic.    Suzette Kan LPN                                 HPI Asha Ardon is a 63 year old female presents for follow up of type 2 diabetes.  She is currently on glucophageXR 500mg a day for this.  Wt Readings from Last 3 Encounters:   03/11/24 89.8 kg (198 lb)   02/28/24 88.9 kg (196 lb)   12/12/23 90.7 kg (200 lb)     Lab Results   Component Value Date    A1C 7.2 12/12/2023    A1C 7.0 07/28/2023    A1C 7.6 02/14/2023        Would like to start on a statin now too.      Chronic neck and lower back pain - ever since repetitive motion activity at work years ago      Current Outpatient Medications   Medication    amitriptyline (ELAVIL) 10 MG tablet    metFORMIN (GLUCOPHAGE XR) 500 MG 24 hr tablet    rosuvastatin (CRESTOR) 10 MG tablet    semaglutide (OZEMPIC) 2 MG/3ML pen    STATIN NOT PRESCRIBED (INTENTIONAL)     No current facility-administered medications for this visit.  "    Past Medical History:   Diagnosis Date    Back pain 1992    X5 total 1994, 1998. 2004    Breast cancer (H) 11/10/1998    bilateral mastectomy for left breast DCIS    Thrombocytopenia (H24) 08/17/2010    resolved, possible anaplasmosis               Review of Systems           10/1/2021     1:54 PM   PHQ   PHQ-9 Total Score 7   Q9: Thoughts of better off dead/self-harm past 2 weeks Not at all          No data to display                      Objective  /70 (BP Location: Right arm, Patient Position: Sitting, Cuff Size: Adult Regular)   Pulse 71   Temp 97.6  F (36.4  C) (Temporal)   Resp 16   Ht 1.676 m (5' 6\")   Wt 89.8 kg (198 lb)   LMP 08/31/2000 (Approximate)   SpO2 96%   BMI 31.96 kg/m     Physical Exam     Wt Readings from Last 4 Encounters:   03/11/24 89.8 kg (198 lb)   02/28/24 88.9 kg (196 lb)   12/12/23 90.7 kg (200 lb)   08/16/23 90.7 kg (200 lb)       GENERAL: alert and no distress    Office Visit on 12/12/2023   Component Date Value Ref Range Status    Babesia Species by PCR 12/12/2023 Not Detected   Final    Babesia Microti by PCR 12/12/2023 Not Detected   Final    INTERPRETIVE INFORMATION: Babesia Species by PCR    A negative result does not rule out the presence of PCR   inhibitors in the patient specimen or test-specific nucleic   acid in concentrations below the level of detection by this   test.    This test was developed and its performance characteristics   determined by [facility]. It has not been cleared or   approved by the [place] Food and Drug Administration. This test   was performed in a CLIA certified laboratory and is   intended for clinical purposes.  Performed By: [facility]  [address] [place] [id number]  : Donaldo Moreno MD, PhD  CLIA Number: [id number]    Sodium 12/12/2023 139  135 - 145 mmol/L Final    Reference intervals for this test were updated on 09/26/2023 to more accurately reflect our healthy population. There may " be differences in the flagging of prior results with similar values performed with this method. Interpretation of those prior results can be made in the context of the updated reference intervals.     Potassium 12/12/2023 3.9  3.4 - 5.3 mmol/L Final    Carbon Dioxide (CO2) 12/12/2023 28  22 - 29 mmol/L Final    Anion Gap 12/12/2023 10  7 - 15 mmol/L Final    Urea Nitrogen 12/12/2023 12.6  8.0 - 23.0 mg/dL Final    Creatinine 12/12/2023 0.84  0.51 - 0.95 mg/dL Final    GFR Estimate 12/12/2023 78  >60 mL/min/1.73m2 Final    Calcium 12/12/2023 9.9  8.8 - 10.2 mg/dL Final    Chloride 12/12/2023 101  98 - 107 mmol/L Final    Glucose 12/12/2023 116 (H)  70 - 99 mg/dL Final    Alkaline Phosphatase 12/12/2023 104  40 - 150 U/L Final    Reference intervals for this test were updated on 11/14/2023 to more accurately reflect our healthy population. There may be differences in the flagging of prior results with similar values performed with this method. Interpretation of those prior results can be made in the context of the updated reference intervals.    AST 12/12/2023 22  0 - 45 U/L Final    Reference intervals for this test were updated on 6/12/2023 to more accurately reflect our healthy population. There may be differences in the flagging of prior results with similar values performed with this method. Interpretation of those prior results can be made in the context of the updated reference intervals.    ALT 12/12/2023 20  0 - 50 U/L Final    Reference intervals for this test were updated on 6/12/2023 to more accurately reflect our healthy population. There may be differences in the flagging of prior results with similar values performed with this method. Interpretation of those prior results can be made in the context of the updated reference intervals.      Protein Total 12/12/2023 7.4  6.4 - 8.3 g/dL Final    Albumin 12/12/2023 4.4  3.5 - 5.2 g/dL Final    Bilirubin Total 12/12/2023 0.3  <=1.2 mg/dL Final    Anaplasma  phagocytophilum 12/12/2023 Not Detected   Final    Ehrlichia chaffeensis 12/12/2023 Not Detected   Final    Ehrlichia ewingii/canis 12/12/2023 Not Detected   Final    Ehrlichia muris-like 12/12/2023 Not Detected   Final    INTERPRETIVE INFORMATION:  Ehrlichia and Anaplasma Species   by PCR      A negative result does not rule out the presence of PCR   inhibitors in the patient specimen or test-specific nucleic   acid in concentrations below the level of detection by this   assay.    This test was developed and its performance characteristics   determined by [facility]. It has not been cleared or   approved by the [place] Food and Drug Administration. This test   was performed in a CLIA certified laboratory and is   intended for clinical purposes.  Performed By: [facility]  [address] [place] [id number]  : Donaldo Moreno MD, PhD  CLIA Number: [id number]    Lyme Disease Antibodies Total 12/12/2023 0.10  <0.90 Final    Non-reactive, Absence of detectable Borrelia burgdorferi antibodies. A non-reactive result does not exclude the possibility of Borrelia burgdorferi infection. If early Lyme disease is suspected, a second sample should be collected and tested 2 to 4 weeks later.    Hemoglobin A1C 12/12/2023 7.2 (H)  4.0 - 6.2 % Final    WBC Count 12/12/2023 5.3  4.0 - 11.0 10e3/uL Final    RBC Count 12/12/2023 4.78  3.80 - 5.20 10e6/uL Final    Hemoglobin 12/12/2023 13.6  11.7 - 15.7 g/dL Final    Hematocrit 12/12/2023 41.2  35.0 - 47.0 % Final    MCV 12/12/2023 86  78 - 100 fL Final    MCH 12/12/2023 28.5  26.5 - 33.0 pg Final    MCHC 12/12/2023 33.0  31.5 - 36.5 g/dL Final    RDW 12/12/2023 13.4  10.0 - 15.0 % Final    Platelet Count 12/12/2023 231  150 - 450 10e3/uL Final    % Neutrophils 12/12/2023 56  % Final    % Lymphocytes 12/12/2023 31  % Final    % Monocytes 12/12/2023 9  % Final    % Eosinophils 12/12/2023 3  % Final    % Basophils 12/12/2023 1  % Final    % Immature  Granulocytes 12/12/2023 0  % Final    NRBCs per 100 WBC 12/12/2023 0  <1 /100 Final    Absolute Neutrophils 12/12/2023 3.0  1.6 - 8.3 10e3/uL Final    Absolute Lymphocytes 12/12/2023 1.6  0.8 - 5.3 10e3/uL Final    Absolute Monocytes 12/12/2023 0.5  0.0 - 1.3 10e3/uL Final    Absolute Eosinophils 12/12/2023 0.1  0.0 - 0.7 10e3/uL Final    Absolute Basophils 12/12/2023 0.0  0.0 - 0.2 10e3/uL Final    Absolute Immature Granulocytes 12/12/2023 0.0  <=0.4 10e3/uL Final    Absolute NRBCs 12/12/2023 0.0  10e3/uL Final

## 2024-03-11 NOTE — NURSING NOTE
"Chief Complaint   Patient presents with    Diabetes      DM check       Initial /70 (BP Location: Right arm, Patient Position: Sitting, Cuff Size: Adult Regular)   Pulse 71   Temp 97.6  F (36.4  C) (Temporal)   Resp 16   Ht 1.676 m (5' 6\")   Wt 89.8 kg (198 lb)   LMP 08/31/2000 (Approximate)   SpO2 96%   BMI 31.96 kg/m   Estimated body mass index is 31.96 kg/m  as calculated from the following:    Height as of this encounter: 1.676 m (5' 6\").    Weight as of this encounter: 89.8 kg (198 lb).    Medication Review: complete    Previous A1C is at goal of <8  Lab Results   Component Value Date    A1C 7.2 12/12/2023    A1C 7.0 07/28/2023    A1C 7.6 02/14/2023     Urine microalbumin:creatine: 4/26/24  Foot exam : 4/26/24  Eye exam:   Making eye doctor appointment  Tobacco User : no  Patient is not on a daily aspirin  Patient is not on a Statin.  Blood pressure today of:     BP Readings from Last 1 Encounters:   03/11/24 126/70      is at the goal of <139/89 for diabetics.    Suzette Kan LPN on 3/11/2024 at 2:09 PM        The next two questions are to help us understand your food security.  If you are feeling you need any assistance in this area, we have resources available to support you today.          2/28/2024   SDOH- Food Insecurity   Within the past 12 months, did you worry that your food would run out before you got money to buy more? N   Within the past 12 months, did the food you bought just not last and you didn t have money to get more? N         Health Care Directive:  Patient does not have a Health Care Directive or Living Will: Patient states has Advance Directive and will bring in a copy to clinic.    Suzette Kan LPN      "

## 2024-05-02 ENCOUNTER — TELEPHONE (OUTPATIENT)
Dept: FAMILY MEDICINE | Facility: OTHER | Age: 64
End: 2024-05-02
Payer: COMMERCIAL

## 2024-05-02 NOTE — TELEPHONE ENCOUNTER
Patient has apptmts coming on 05/14 and she is wondering if you still want her to keep them because she has never gotten the ozempic.  Please call        Sabrina Cabral on 5/2/2024 at 12:25 PM

## 2024-05-02 NOTE — TELEPHONE ENCOUNTER
"Patient's last appointment was 3/11/24 for diabetic check. Per note, \"follow up in 2 months with lab.\" Patient states she never received Ozempic. Upon chart review, Ozempic was \"stuck\" in the system so it never made it to the pharmacy on 3/11/24. Writer released it so pharmacy received it. Patient wondering if she should reschedule her next appointment until she has been on Ozempic for a month or 2?    Sparkle Moreira CMA on 5/2/2024 at 1:31 PM        "

## 2024-05-02 NOTE — TELEPHONE ENCOUNTER
Patient informed of provider response. Transferred to scheduling to reschedule appointment.     Sparkle Moreira CMA on 5/2/2024 at 2:17 PM

## 2024-05-02 NOTE — TELEPHONE ENCOUNTER
Yes, I would say that she should reschedule until she has been on it for 2 months or so if there was a delay in this.     Sincerely,   Sparkle Rich NP on 5/2/2024 at 2:02 PM

## 2024-07-10 ENCOUNTER — OFFICE VISIT (OUTPATIENT)
Dept: FAMILY MEDICINE | Facility: OTHER | Age: 64
End: 2024-07-10
Attending: FAMILY MEDICINE
Payer: COMMERCIAL

## 2024-07-10 ENCOUNTER — LAB (OUTPATIENT)
Dept: LAB | Facility: OTHER | Age: 64
End: 2024-07-10
Attending: FAMILY MEDICINE
Payer: COMMERCIAL

## 2024-07-10 VITALS
RESPIRATION RATE: 14 BRPM | HEIGHT: 66 IN | OXYGEN SATURATION: 94 % | BODY MASS INDEX: 31.08 KG/M2 | WEIGHT: 193.4 LBS | SYSTOLIC BLOOD PRESSURE: 136 MMHG | TEMPERATURE: 98.4 F | DIASTOLIC BLOOD PRESSURE: 82 MMHG | HEART RATE: 78 BPM

## 2024-07-10 DIAGNOSIS — E11.9 TYPE 2 DIABETES MELLITUS WITHOUT COMPLICATION, WITHOUT LONG-TERM CURRENT USE OF INSULIN (H): Primary | ICD-10-CM

## 2024-07-10 DIAGNOSIS — E66.811 CLASS 1 OBESITY WITH SERIOUS COMORBIDITY AND BODY MASS INDEX (BMI) OF 31.0 TO 31.9 IN ADULT, UNSPECIFIED OBESITY TYPE: ICD-10-CM

## 2024-07-10 DIAGNOSIS — E11.9 TYPE 2 DIABETES MELLITUS WITHOUT COMPLICATION, WITHOUT LONG-TERM CURRENT USE OF INSULIN (H): ICD-10-CM

## 2024-07-10 LAB
ALBUMIN SERPL BCG-MCNC: 4.4 G/DL (ref 3.5–5.2)
ALP SERPL-CCNC: 102 U/L (ref 40–150)
ALT SERPL W P-5'-P-CCNC: 19 U/L (ref 0–50)
ANION GAP SERPL CALCULATED.3IONS-SCNC: 8 MMOL/L (ref 7–15)
AST SERPL W P-5'-P-CCNC: 21 U/L (ref 0–45)
BILIRUB SERPL-MCNC: 0.5 MG/DL
BUN SERPL-MCNC: 14.7 MG/DL (ref 8–23)
CALCIUM SERPL-MCNC: 9.9 MG/DL (ref 8.8–10.2)
CHLORIDE SERPL-SCNC: 101 MMOL/L (ref 98–107)
CHOLEST SERPL-MCNC: 221 MG/DL
CREAT SERPL-MCNC: 0.88 MG/DL (ref 0.51–0.95)
DEPRECATED HCO3 PLAS-SCNC: 28 MMOL/L (ref 22–29)
EGFRCR SERPLBLD CKD-EPI 2021: 73 ML/MIN/1.73M2
FASTING STATUS PATIENT QL REPORTED: NO
FASTING STATUS PATIENT QL REPORTED: NO
GLUCOSE SERPL-MCNC: 115 MG/DL (ref 70–99)
HBA1C MFR BLD: 6.5 % (ref 4–6.2)
HDLC SERPL-MCNC: 56 MG/DL
LDLC SERPL CALC-MCNC: 143 MG/DL
NONHDLC SERPL-MCNC: 165 MG/DL
POTASSIUM SERPL-SCNC: 4.3 MMOL/L (ref 3.4–5.3)
PROT SERPL-MCNC: 7.5 G/DL (ref 6.4–8.3)
SODIUM SERPL-SCNC: 137 MMOL/L (ref 135–145)
TRIGL SERPL-MCNC: 112 MG/DL

## 2024-07-10 PROCEDURE — 99213 OFFICE O/P EST LOW 20 MIN: CPT | Performed by: FAMILY MEDICINE

## 2024-07-10 PROCEDURE — 80061 LIPID PANEL: CPT | Mod: ZL

## 2024-07-10 PROCEDURE — 36415 COLL VENOUS BLD VENIPUNCTURE: CPT | Mod: ZL

## 2024-07-10 PROCEDURE — G2211 COMPLEX E/M VISIT ADD ON: HCPCS | Performed by: FAMILY MEDICINE

## 2024-07-10 PROCEDURE — 99207 PR FOOT EXAM NO CHARGE: CPT | Performed by: FAMILY MEDICINE

## 2024-07-10 PROCEDURE — 83036 HEMOGLOBIN GLYCOSYLATED A1C: CPT | Mod: ZL

## 2024-07-10 PROCEDURE — 80053 COMPREHEN METABOLIC PANEL: CPT | Mod: ZL

## 2024-07-10 RX ORDER — PEN NEEDLE, DIABETIC 32GX 5/32"
NEEDLE, DISPOSABLE MISCELLANEOUS
COMMUNITY
Start: 2024-06-07

## 2024-07-10 RX ORDER — ROSUVASTATIN CALCIUM 10 MG/1
5 TABLET, COATED ORAL DAILY
Qty: 45 TABLET | Refills: 4 | Status: SHIPPED | OUTPATIENT
Start: 2024-07-10

## 2024-07-10 ASSESSMENT — PAIN SCALES - GENERAL: PAINLEVEL: NO PAIN (0)

## 2024-07-10 NOTE — NURSING NOTE
"Chief Complaint   Patient presents with    Recheck Medication     F/U for A1C, cholesterol       Initial /82 (BP Location: Right arm, Patient Position: Sitting, Cuff Size: Adult Regular)   Pulse 78   Temp 98.4  F (36.9  C) (Tympanic)   Resp 14   Ht 1.676 m (5' 6\")   Wt 87.7 kg (193 lb 6.4 oz)   LMP 08/31/2000 (Approximate)   SpO2 94%   BMI 31.22 kg/m   Estimated body mass index is 31.22 kg/m  as calculated from the following:    Height as of this encounter: 1.676 m (5' 6\").    Weight as of this encounter: 87.7 kg (193 lb 6.4 oz).    Medication Review: complete    The next two questions are to help us understand your food security.  If you are feeling you need any assistance in this area, we have resources available to support you today.          2/28/2024   SDOH- Food Insecurity   Within the past 12 months, did you worry that your food would run out before you got money to buy more? N   Within the past 12 months, did the food you bought just not last and you didn t have money to get more? N          Health Care Directive:  Patient does not have a Health Care Directive or Living Will: Discussed advance care planning with patient; however, patient declined at this time.    Suzette Kan LPN      "

## 2024-07-10 NOTE — PROGRESS NOTES
"    Assessment & Plan       ICD-10-CM    1. Type 2 diabetes mellitus without complication, without long-term current use of insulin (H)  E11.9 FOOT EXAM     rosuvastatin (CRESTOR) 10 MG tablet     semaglutide (OZEMPIC) 2 MG/3ML pen     DISCONTINUED: semaglutide (OZEMPIC) 2 MG/3ML pen      2. Class 1 obesity with serious comorbidity and body mass index (BMI) of 31.0 to 31.9 in adult, unspecified obesity type  E66.9     Z68.31            I have personally reviewed the labs listed below.  Continue same dose of metformin and increase Ozempic to 0.5 mg each week.  Reviewed potential side effects with increasing this dose.  Continue to work on dietary and lifestyle factors to help with weight management and diabetes care.  She is willing to resume statin.  We discussed doing 1 month of taking co-Q10 and then taking co-Q10 along with her statin to see if this decreases side effects.  She is agreeable to this plan.  Eye exam is due    PDMP Review         Value Time User    State PDMP site checked  Yes 8/8/2023  4:07 PM Emily Love APRN CNP                     The longitudinal plan of care for type 2 diabetes  was addressed during this visit. Due to the added complexity in care, I will continue to support Asha Ardon in the subsequent management of this condition(s) and with the ongoing continuity of care of this condition(s).          Return in about 3 months (around 10/10/2024).    EMERY NINO MD  [facility]    Subjective   Asha Ardon is a 64 year old female  presenting for the following health issues: Nursing Notes:   Suzette Kan LPN  7/10/2024 11:36 AM  Signed  Chief Complaint   Patient presents with    Recheck Medication     F/U for A1C, cholesterol       Initial /82 (BP Location: Right arm, Patient Position: Sitting, Cuff Size: Adult Regular)   Pulse 78   Temp 98.4  F (36.9  C) (Tympanic)   Resp 14   Ht 1.676 m (5' 6\")   Wt 87.7 kg (193 lb 6.4 oz)   LMP " "08/31/2000 (Approximate)   SpO2 94%   BMI 31.22 kg/m   Estimated body mass index is 31.22 kg/m  as calculated from the following:    Height as of this encounter: 1.676 m (5' 6\").    Weight as of this encounter: 87.7 kg (193 lb 6.4 oz).    Medication Review: complete    The next two questions are to help us understand your food security.  If you are feeling you need any assistance in this area, we have resources available to support you today.          2/28/2024   SDOH- Food Insecurity   Within the past 12 months, did you worry that your food would run out before you got money to buy more? N   Within the past 12 months, did the food you bought just not last and you didn t have money to get more? N          Health Care Directive:  Patient does not have a Health Care Directive or Living Will: Discussed advance care planning with patient; however, patient declined at this time.    Suzette Kan, SMITH                                 HPI Asha Ardon is a 64 year old female presents for follow up of type 2 diabetes and lipid treatment.  She is currently on Ozempic 0.25 mg a day and extended release metformin 500 mg daily.    Has tried rosuvastatin and she was on one other type as well.  Soon after taking this she woke up achy and had some cramps at night and her joints seemed to hurt more too.      Ozempic - has taken away the brain signal to eat.  Has had some mild nausea.  Has had constipation with this and treats this with increased fiber.      Current Outpatient Medications   Medication Sig Dispense Refill    BD PEN NEEDLE SABINE 2ND GEN 32G X 4 MM miscellaneous USE TO INJECT OZEMPIC EVERY WEEK      rosuvastatin (CRESTOR) 10 MG tablet Take 0.5 tablets (5 mg) by mouth daily 45 tablet 4    semaglutide (OZEMPIC) 2 MG/3ML pen Inject 0.5 mg subcutaneously every 7 days 3 mL 5    amitriptyline (ELAVIL) 10 MG tablet 1-2 at bedtime for back pain 30 tablet 5    metFORMIN (GLUCOPHAGE XR) 500 MG 24 hr tablet Take 1 tablet (500 mg) " "by mouth daily (with dinner) 90 tablet 3    STATIN NOT PRESCRIBED (INTENTIONAL) Please choose reason not prescribed from choices below.       No current facility-administered medications for this visit.     Past Medical History:   Diagnosis Date    Back pain 1992    X5 total 1994, 1998. 2004    Breast cancer (H) 11/10/1998    bilateral mastectomy for left breast DCIS    Thrombocytopenia (H24) 08/17/2010    resolved, possible anaplasmosis         Diabetes Follow-up    How often are you checking your blood sugar? Not at all  What concerns do you have today about your diabetes? None   Do you have any of these symptoms? (Select all that apply)  No numbness or tingling in feet.  No redness, sores or blisters on feet.  No complaints of excessive thirst.  No reports of blurry vision.  No significant changes to weight.  Have you had a diabetic eye exam in the last 12 months? No        BP Readings from Last 2 Encounters:   07/10/24 136/82   03/13/24 132/71     Hemoglobin A1C (%)   Date Value   07/10/2024 6.5 (H)   12/12/2023 7.2 (H)     LDL Cholesterol Calculated (mg/dL)   Date Value   07/10/2024 143 (H)   02/14/2023 130 (H)             Hyperlipidemia Follow-Up    Are you regularly taking any medication or supplement to lower your cholesterol?   Yes- but recently stopped   Are you having muscle aches or other side effects that you think could be caused by your cholesterol lowering medication?  Yes-      Review of Systems           10/1/2021     1:54 PM   PHQ   PHQ-9 Total Score 7   Q9: Thoughts of better off dead/self-harm past 2 weeks Not at all          No data to display                      Objective  /82 (BP Location: Right arm, Patient Position: Sitting, Cuff Size: Adult Regular)   Pulse 78   Temp 98.4  F (36.9  C) (Tympanic)   Resp 14   Ht 1.676 m (5' 6\")   Wt 87.7 kg (193 lb 6.4 oz)   LMP 08/31/2000 (Approximate)   SpO2 94%   BMI 31.22 kg/m     Wt Readings from Last 4 Encounters:   07/10/24 87.7 kg (193 " lb 6.4 oz)   03/13/24 88.9 kg (196 lb)   03/11/24 89.8 kg (198 lb)   02/28/24 88.9 kg (196 lb)     Wt Readings from Last 8 Encounters:   07/10/24 87.7 kg (193 lb 6.4 oz)   03/13/24 88.9 kg (196 lb)   03/11/24 89.8 kg (198 lb)   02/28/24 88.9 kg (196 lb)   12/12/23 90.7 kg (200 lb)   08/16/23 90.7 kg (200 lb)   08/08/23 91.5 kg (201 lb 12.8 oz)   05/09/23 93 kg (205 lb)       Physical Exam   GENERAL: alert and no distress  Diabetic foot exam: normal DP and PT pulses, no trophic changes or ulcerative lesions, normal sensory exam, and mosquito bite top of right foot     Results for orders placed or performed in visit on 07/10/24   Hemoglobin A1c     Status: Abnormal   Result Value Ref Range    Hemoglobin A1C 6.5 (H) 4.0 - 6.2 %   Lipid Profile     Status: Abnormal   Result Value Ref Range    Cholesterol 221 (H) <200 mg/dL    Triglycerides 112 <150 mg/dL    Direct Measure HDL 56 >=50 mg/dL    LDL Cholesterol Calculated 143 (H) <=100 mg/dL    Non HDL Cholesterol 165 (H) <130 mg/dL    Patient Fasting > 8hrs? No     Narrative    Cholesterol  Desirable:  <200 mg/dL    Triglycerides  Normal:  Less than 150 mg/dL  Borderline High:  150-199 mg/dL  High:  200-499 mg/dL  Very High:  Greater than or equal to 500 mg/dL    Direct Measure HDL  Female:  Greater than or equal to 50 mg/dL   Male:  Greater than or equal to 40 mg/dL    LDL Cholesterol  Desirable:  <100mg/dL  Above Desirable:  100-129 mg/dL   Borderline High:  130-159 mg/dL   High:  160-189 mg/dL   Very High:  >= 190 mg/dL    Non HDL Cholesterol  Desirable:  130 mg/dL  Above Desirable:  130-159 mg/dL  Borderline High:  160-189 mg/dL  High:  190-219 mg/dL  Very High:  Greater than or equal to 220 mg/dL   Comprehensive Metabolic Panel     Status: Abnormal   Result Value Ref Range    Sodium 137 135 - 145 mmol/L    Potassium 4.3 3.4 - 5.3 mmol/L    Carbon Dioxide (CO2) 28 22 - 29 mmol/L    Anion Gap 8 7 - 15 mmol/L    Urea Nitrogen 14.7 8.0 - 23.0 mg/dL    Creatinine 0.88  0.51 - 0.95 mg/dL    GFR Estimate 73 >60 mL/min/1.73m2    Calcium 9.9 8.8 - 10.2 mg/dL    Chloride 101 98 - 107 mmol/L    Glucose 115 (H) 70 - 99 mg/dL    Alkaline Phosphatase 102 40 - 150 U/L    AST 21 0 - 45 U/L    ALT 19 0 - 50 U/L    Protein Total 7.5 6.4 - 8.3 g/dL    Albumin 4.4 3.5 - 5.2 g/dL    Bilirubin Total 0.5 <=1.2 mg/dL    Patient Fasting > 8hrs? No              Answers submitted by the patient for this visit:  Diabetes Visit (Submitted on 7/9/2024)  Chief Complaint: Chronic problems general questions HPI Form  Frequency of checking blood sugars:: not at all  Diabetic concerns:: other  Paraesthesia present:: none of these symptoms  Have you had a diabetic eye exam within the last year?: No  General Questionnaire (Submitted on 7/9/2024)  Chief Complaint: Chronic problems general questions HPI Form  How many servings of fruits and vegetables do you eat daily?: 2-3  On average, how many sweetened beverages do you drink each day (Examples: soda, juice, sweet tea, etc.  Do NOT count diet or artificially sweetened beverages)?: 1  How many minutes a day do you exercise enough to make your heart beat faster?: 10 to 19  How many days a week do you exercise enough to make your heart beat faster?: 3 or less  How many days per week do you miss taking your medication?: 0

## 2024-07-25 ENCOUNTER — OFFICE VISIT (OUTPATIENT)
Dept: FAMILY MEDICINE | Facility: OTHER | Age: 64
End: 2024-07-25
Attending: NURSE PRACTITIONER
Payer: COMMERCIAL

## 2024-07-25 VITALS
DIASTOLIC BLOOD PRESSURE: 74 MMHG | RESPIRATION RATE: 16 BRPM | WEIGHT: 192.4 LBS | OXYGEN SATURATION: 99 % | SYSTOLIC BLOOD PRESSURE: 124 MMHG | BODY MASS INDEX: 31.05 KG/M2 | TEMPERATURE: 97.4 F | HEART RATE: 78 BPM

## 2024-07-25 DIAGNOSIS — N39.0 ACUTE UTI (URINARY TRACT INFECTION): Primary | ICD-10-CM

## 2024-07-25 DIAGNOSIS — N32.89 BLADDER SPASMS: ICD-10-CM

## 2024-07-25 DIAGNOSIS — R39.89 SENSATION OF PRESSURE IN BLADDER AREA: ICD-10-CM

## 2024-07-25 DIAGNOSIS — R39.9 UTI SYMPTOMS: ICD-10-CM

## 2024-07-25 DIAGNOSIS — R31.0 GROSS HEMATURIA: ICD-10-CM

## 2024-07-25 DIAGNOSIS — R35.0 URINARY FREQUENCY: ICD-10-CM

## 2024-07-25 DIAGNOSIS — R39.15 URINARY URGENCY: ICD-10-CM

## 2024-07-25 LAB
ALBUMIN UR-MCNC: NEGATIVE MG/DL
APPEARANCE UR: CLEAR
BACTERIA #/AREA URNS HPF: ABNORMAL /HPF
BILIRUB UR QL STRIP: NEGATIVE
COLOR UR AUTO: YELLOW
GLUCOSE UR STRIP-MCNC: NEGATIVE MG/DL
HGB UR QL STRIP: ABNORMAL
KETONES UR STRIP-MCNC: NEGATIVE MG/DL
LEUKOCYTE ESTERASE UR QL STRIP: ABNORMAL
MUCOUS THREADS #/AREA URNS LPF: PRESENT /LPF
NITRATE UR QL: NEGATIVE
PH UR STRIP: 5.5 [PH] (ref 5–9)
RBC URINE: 13 /HPF
SP GR UR STRIP: 1.01 (ref 1–1.03)
UROBILINOGEN UR STRIP-MCNC: NORMAL MG/DL
WBC URINE: 74 /HPF

## 2024-07-25 PROCEDURE — 87186 SC STD MICRODIL/AGAR DIL: CPT | Mod: ZL

## 2024-07-25 PROCEDURE — 87086 URINE CULTURE/COLONY COUNT: CPT | Mod: ZL

## 2024-07-25 PROCEDURE — 81001 URINALYSIS AUTO W/SCOPE: CPT | Mod: ZL

## 2024-07-25 PROCEDURE — 99214 OFFICE O/P EST MOD 30 MIN: CPT

## 2024-07-25 RX ORDER — NITROFURANTOIN 25; 75 MG/1; MG/1
100 CAPSULE ORAL 2 TIMES DAILY
Qty: 14 CAPSULE | Refills: 0 | Status: SHIPPED | OUTPATIENT
Start: 2024-07-25

## 2024-07-25 ASSESSMENT — PAIN SCALES - GENERAL: PAINLEVEL: NO PAIN (0)

## 2024-07-25 NOTE — PROGRESS NOTES
ASSESSMENT/PLAN:    I have reviewed the nursing notes.  I have reviewed the findings, diagnosis, plan and need for follow up with the patient.    1. UTI symptoms  2. Urinary frequency  3. Sensation of pressure in bladder area  4. Bladder spasms  5. Gross hematuria  6. Urinary urgency    - UA Macroscopic with reflex to Microscopic and Culture-large amount of blood, moderate amount of leukocytes, few bacteria, mucus present, > 13 RBC urine, > 74 WBC urine.    - Urine Culture-results pending    7. Acute UTI (urinary tract infection)    - nitroFURantoin macrocrystal-monohydrate (MACROBID) 100 MG capsule; Take 1 capsule (100 mg) by mouth 2 times daily  Dispense: 14 capsule; Refill: 0    - Please read the attached information on urinary tract infections in females, bladder diet and constipation for at home care treatment as discussed in the clinic this evening.    - May use over-the-counter Tylenol as needed for pain or fever    - Discussed warning signs/symptoms indicative of need to f/u    - Follow up if symptoms persist or worsen or concerns    - I explained my diagnostic considerations and recommendations to the patient, who voiced understanding and agreement with the treatment plan. All questions were answered. We discussed potential side effects of any prescribed or recommended therapies, as well as expectations for response to treatments.    KIN Barnes CNP  7/25/2024  6:18 PM    HPI:    Asha Ardon is a 64 year old female who presents to [facility] today for concerns of possible UTI.  Patient states that her symptoms came on abruptly yesterday which consist of urinary frequency, sensation of pressure in bladder area, bladder spasms, urinary urgency and gross hematuria.  Patient denies fever, low back or flank pain, shortness of breath, chest discomfort, nausea, vomiting, diarrhea.  Patient does state that she suffers from constipation as well.  At home care treatment consists of increasing water intake.   Patient states that she has not had a urinary tract and infection for many years.    Past Medical History:   Diagnosis Date    Back pain 1992    X5 total 1994, 1998. 2004    Breast cancer (H) 11/10/1998    bilateral mastectomy for left breast DCIS    Thrombocytopenia (H24) 08/17/2010    resolved, possible anaplasmosis     Past Surgical History:   Procedure Laterality Date    ANESTH,SKIN SURGERY, BACK  1992, 1994, 1998,2004    BREAST BIOPSY, RT/LT  11/10/1998    left breast biopsy    COLONOSCOPY  08/15/2019    HYSTERECTOMY, IMTIAZ  01/01/2000    with BSO    MASTECTOMY, BILATERAL  11/19/1998    ZZC ANESTH,SURG BREAST RECONSTRUCTIVE Bilateral 2004     Social History     Tobacco Use    Smoking status: Never     Passive exposure: Past    Smokeless tobacco: Never   Substance Use Topics    Alcohol use: Yes     Comment: socially - couple drinks every 3 months     Current Outpatient Medications   Medication Sig Dispense Refill    amitriptyline (ELAVIL) 10 MG tablet 1-2 at bedtime for back pain 30 tablet 5    BD PEN NEEDLE SABINE 2ND GEN 32G X 4 MM miscellaneous USE TO INJECT OZEMPIC EVERY WEEK      metFORMIN (GLUCOPHAGE XR) 500 MG 24 hr tablet Take 1 tablet (500 mg) by mouth daily (with dinner) 90 tablet 3    nitroFURantoin macrocrystal-monohydrate (MACROBID) 100 MG capsule Take 1 capsule (100 mg) by mouth 2 times daily 14 capsule 0    rosuvastatin (CRESTOR) 10 MG tablet Take 0.5 tablets (5 mg) by mouth daily 45 tablet 4    semaglutide (OZEMPIC) 2 MG/3ML pen Inject 0.5 mg subcutaneously every 7 days 3 mL 5    STATIN NOT PRESCRIBED (INTENTIONAL) Please choose reason not prescribed from choices below.       Allergies   Allergen Reactions    Ancef [Cefazolin Sodium] Rash    Biaxin [Clarithromycin] Rash    Contrast Dye Rash     Past medical history, past surgical history, current medications and allergies reviewed and accurate to the best of my knowledge.      ROS:  Refer to HPI    /74   Pulse 78   Temp 97.4  F (36.3  C)  (Temporal)   Resp 16   Wt 87.3 kg (192 lb 6.4 oz)   LMP 08/31/2000 (Approximate)   SpO2 99%   Breastfeeding No   BMI 31.05 kg/m      EXAM:  General Appearance: Well appearing 64 year old female, appropriate appearance for age. No acute distress   Respiratory: normal chest wall and respirations.  Normal effort.  Clear to auscultation bilaterally, no wheezing, crackles or rhonchi.  No increased work of breathing.  No cough appreciated.  Cardiac: RRR with no murmurs  Abdomen: soft, nontender, no rigidity, no rebound tenderness or guarding, normal bowel sounds present  :  No suprapubic tenderness to palpation.  Absent CVA tenderness to palpation.    Musculoskeletal:  Equal movement of bilateral upper extremities.  Equal movement of bilateral lower extremities.  Normal gait.    Neuro: Alert and oriented to person, place, and time.    Psychological: normal affect, alert, oriented, and pleasant.     Labs:  Results for orders placed or performed in visit on 07/25/24   UA Macroscopic with reflex to Microscopic and Culture     Status: Abnormal    Specimen: Urine, Clean Catch   Result Value Ref Range    Color Urine Yellow Colorless, Straw, Light Yellow, Yellow    Appearance Urine Clear Clear    Glucose Urine Negative Negative mg/dL    Bilirubin Urine Negative Negative    Ketones Urine Negative Negative mg/dL    Specific Gravity Urine 1.009 1.000 - 1.030    Blood Urine Large (A) Negative    pH Urine 5.5 5.0 - 9.0    Protein Albumin Urine Negative Negative mg/dL    Urobilinogen Urine Normal Normal, 2.0 mg/dL    Nitrite Urine Negative Negative    Leukocyte Esterase Urine Moderate (A) Negative    Bacteria Urine Few (A) None Seen /HPF    Mucus Urine Present (A) None Seen /LPF    RBC Urine 13 (H) <=2 /HPF    WBC Urine 74 (H) <=5 /HPF    Narrative    Urine Culture ordered based on laboratory criteria

## 2024-07-25 NOTE — NURSING NOTE
"Chief Complaint   Patient presents with    Urinary Frequency     Since last night   She has had urinary frequency and urgency since last night. Today when she wiped there was a little bit of blood.  Liyah Pittman LPN..................7/25/2024   6:46 PM      Initial /74   Pulse 78   Temp 97.4  F (36.3  C) (Temporal)   Resp 16   Wt 87.3 kg (192 lb 6.4 oz)   LMP 08/31/2000 (Approximate)   SpO2 99%   Breastfeeding No   BMI 31.05 kg/m   Estimated body mass index is 31.05 kg/m  as calculated from the following:    Height as of 7/10/24: 1.676 m (5' 6\").    Weight as of this encounter: 87.3 kg (192 lb 6.4 oz).  Medication Review: complete    The next two questions are to help us understand your food security.  If you are feeling you need any assistance in this area, we have resources available to support you today.          2/28/2024   SDOH- Food Insecurity   Within the past 12 months, did you worry that your food would run out before you got money to buy more? N   Within the past 12 months, did the food you bought just not last and you didn t have money to get more? N            Health Care Directive:  Patient does not have a Health Care Directive or Living Will: Discussed advance care planning with patient; however, patient declined at this time.    Liyah Pittman LPN      "

## 2024-07-28 LAB — BACTERIA UR CULT: ABNORMAL

## 2024-08-22 ENCOUNTER — TRANSFERRED RECORDS (OUTPATIENT)
Dept: HEALTH INFORMATION MANAGEMENT | Facility: OTHER | Age: 64
End: 2024-08-22
Payer: COMMERCIAL

## 2024-11-07 ENCOUNTER — TRANSFERRED RECORDS (OUTPATIENT)
Dept: HEALTH INFORMATION MANAGEMENT | Facility: OTHER | Age: 64
End: 2024-11-07
Payer: COMMERCIAL

## 2024-11-07 LAB — RETINOPATHY: NEGATIVE

## 2025-01-12 NOTE — PROGRESS NOTES
"    Assessment & Plan       ICD-10-CM    1. Type 2 diabetes mellitus without complication, without long-term current use of insulin (H)  E11.9 metFORMIN (GLUCOPHAGE XR) 500 MG 24 hr tablet     rosuvastatin (CRESTOR) 10 MG tablet      2. History of back surgery  Z98.890 amitriptyline (ELAVIL) 10 MG tablet      3. Hot flashes  R23.2 CBC W PLT No Diff     TSH      4. Class 1 obesity with serious comorbidity and body mass index (BMI) of 31.0 to 31.9 in adult, unspecified obesity type  E66.811     Z68.31            I have personally reviewed the labs listed below.  Continue same diabetes treatment with metformin and semaglutide.  In discussing semaglutide, decision was made to continue with the same dose as A1c is at goal.  Continue statin therapy.  She should consider aspirin.  Chronic back pain, nerve symptoms managed with amitriptyline, continue same treatment  Hot flashes, new over the past few months.  Will check CBC and TSH.  She does not have symptoms during the day, does not have respiratory changes nor weight loss.  Follow-up this summer for Medicare visit.    PDMP Review         Value Time User    State PDMP site checked  Yes 8/8/2023  4:07 PM Emily Love APRN CNP                     The longitudinal plan of care was addressed during this visit. Due to the added complexity in care, I will continue to support Asha Ardon in the subsequent management of this condition(s) and with the ongoing continuity of care of this condition(s).        BMI  Estimated body mass index is 31.31 kg/m  as calculated from the following:    Height as of this encounter: 1.676 m (5' 6\").    Weight as of this encounter: 88 kg (194 lb).   On GLP1      EMERY NINO MD  [facility]    Subjective   Asha Ardon is a 64 year old female  presenting for the following health issues: Nursing Notes:   Suzette Kan LPN  1/13/2025  8:52 AM  Signed  Chief Complaint   Patient presents with    Diabetes     " "6 month DM check, medications       Initial /80 (BP Location: Right arm, Patient Position: Sitting, Cuff Size: Adult Regular)   Pulse 81   Temp 97.6  F (36.4  C) (Temporal)   Resp 16   Ht 1.676 m (5' 6\")   Wt 88 kg (194 lb)   LMP 08/31/2000 (Approximate)   SpO2 96%   Breastfeeding No   BMI 31.31 kg/m   Estimated body mass index is 31.31 kg/m  as calculated from the following:    Height as of this encounter: 1.676 m (5' 6\").    Weight as of this encounter: 88 kg (194 lb).    Medication Review: complete    Previous A1C is at goal of <8  Lab Results   Component Value Date    A1C 6.6 01/13/2025    A1C 6.5 07/10/2024    A1C 7.2 12/12/2023    A1C 7.0 07/28/2023    A1C 7.6 02/14/2023     Urine microalbumin:creatine: due 1/13/25  Foot exam :  7/10/24  Eye exam :   11/7/24  Tobacco User :  no  Patient is not on a daily aspirin  Patient is on a Statin.  Blood pressure today of:     BP Readings from Last 1 Encounters:   01/13/25 124/80      is at the goal of <139/89 for diabetics.    Suzette Kan LPN on 1/13/2025 at 8:50 AM        The next two questions are to help us understand your food security.  If you are feeling you need any assistance in this area, we have resources available to support you today.          2/28/2024   SDOH- Food Insecurity   Within the past 12 months, did you worry that your food would run out before you got money to buy more? N   Within the past 12 months, did the food you bought just not last and you didn t have money to get more? N       Health Care Directive:  Patient does not have a Health Care Directive: Patient states has Advance Directive and will bring in a copy to clinic.    Suzette Kan LPN                                 [facility] Asha Ardon is a 64 year old female presents for follow up of type 2 diabetes and new medication.  Lab Results   Component Value Date    A1C 6.5 07/10/2024    A1C 7.2 12/12/2023    A1C 7.0 07/28/2023    A1C 7.6 02/14/2023              Current " Outpatient Medications   Medication Sig Dispense Refill    amitriptyline (ELAVIL) 10 MG tablet 1-2 at bedtime for back pain 90 tablet 4    BD PEN NEEDLE SABINE 2ND GEN 32G X 4 MM miscellaneous USE TO INJECT OZEMPIC EVERY WEEK      metFORMIN (GLUCOPHAGE XR) 500 MG 24 hr tablet Take 1 tablet (500 mg) by mouth daily (with dinner). 90 tablet 3    rosuvastatin (CRESTOR) 10 MG tablet Take 0.5 tablets (5 mg) by mouth daily. 45 tablet 4    semaglutide (OZEMPIC) 2 MG/3ML pen Inject 0.5 mg subcutaneously every 7 days 3 mL 5     No current facility-administered medications for this visit.     Past Medical History:   Diagnosis Date    Back pain 1992    X5 total 1994, 1998. 2004    Breast cancer (H) 11/10/1998    bilateral mastectomy for left breast DCIS    Thrombocytopenia 08/17/2010    resolved, possible anaplasmosis         Diabetes Follow-up    How often are you checking your blood sugar? Not at all  What concerns do you have today about your diabetes? None   Do you have any of these symptoms? (Select all that apply)  No numbness or tingling in feet.  No redness, sores or blisters on feet.  No complaints of excessive thirst.  No reports of blurry vision.  No significant changes to weight.    Walking three days a week outside.        Hyperlipidemia Follow-Up    Are you regularly taking any medication or supplement to lower your cholesterol?   Yes- statin every other day   Are you having muscle aches or other side effects that you think could be caused by your cholesterol lowering medication?  No    Hypertension Follow-up    Do you check your blood pressure regularly outside of the clinic? No   Are you following a low salt diet? Limites added salt    Are your blood pressures ever more than 140 on the top number (systolic) OR more   than 90 on the bottom number (diastolic), for example 140/90?     BP Readings from Last 2 Encounters:   01/13/25 124/80   07/25/24 124/74     Hemoglobin A1C (%)   Date Value   01/13/2025 6.6 (H)  "  07/10/2024 6.5 (H)     LDL Cholesterol Calculated (mg/dL)   Date Value   01/13/2025 89   07/10/2024 143 (H)       Review of Systems     Some bilateral hip pain  LBP, ongoing and can hurt to bend over and affects her left leg   - also affects her sleep      Eyes - went to specialist and determined that what was seen was old/scar/stable      Hot flashes are back.  A couple of times a night.  Came back late fall.  Not during the day.          10/1/2021     1:54 PM   PHQ   PHQ-9 Total Score 7   Q9: Thoughts of better off dead/self-harm past 2 weeks Not at all          No data to display                      Objective  /80 (BP Location: Right arm, Patient Position: Sitting, Cuff Size: Adult Regular)   Pulse 81   Temp 97.6  F (36.4  C) (Temporal)   Resp 16   Ht 1.676 m (5' 6\")   Wt 88 kg (194 lb)   LMP 08/31/2000 (Approximate)   SpO2 96%   Breastfeeding No   BMI 31.31 kg/m     Physical Exam   GENERAL: alert and no distress    Results for orders placed or performed in visit on 01/13/25   Hemoglobin     Status: Normal   Result Value Ref Range    Hemoglobin 13.3 11.7 - 15.7 g/dL   Albumin Random Urine Quantitative with Creat Ratio     Status: None   Result Value Ref Range    Creatinine Urine mg/dL 53.9 mg/dL    Albumin Urine mg/L <12.0 mg/L    Albumin Urine mg/g Cr     Comprehensive Metabolic Panel     Status: Abnormal   Result Value Ref Range    Sodium 139 135 - 145 mmol/L    Potassium 4.4 3.4 - 5.3 mmol/L    Carbon Dioxide (CO2) 26 22 - 29 mmol/L    Anion Gap 12 7 - 15 mmol/L    Urea Nitrogen 15.7 8.0 - 23.0 mg/dL    Creatinine 0.96 (H) 0.51 - 0.95 mg/dL    GFR Estimate 66 >60 mL/min/1.73m2    Calcium 9.2 8.8 - 10.4 mg/dL    Chloride 101 98 - 107 mmol/L    Glucose 118 (H) 70 - 99 mg/dL    Alkaline Phosphatase 103 40 - 150 U/L    AST 21 0 - 45 U/L    ALT 24 0 - 50 U/L    Protein Total 7.1 6.4 - 8.3 g/dL    Albumin 4.3 3.5 - 5.2 g/dL    Bilirubin Total 0.5 <=1.2 mg/dL    Patient Fasting > 8hrs? Unknown  "   Lipid Profile     Status: None   Result Value Ref Range    Cholesterol 162 <200 mg/dL    Triglycerides 71 <150 mg/dL    Direct Measure HDL 59 >=50 mg/dL    LDL Cholesterol Calculated 89 <100 mg/dL    Non HDL Cholesterol 103 <130 mg/dL    Patient Fasting > 8hrs? Unknown     Narrative    Cholesterol  Desirable: < 200 mg/dL  Borderline High: 200 - 239 mg/dL  High: >= 240 mg/dL    Triglycerides  Normal: < 150 mg/dL  Borderline High: 150 - 199 mg/dL  High: 200-499 mg/dL  Very High: >= 500 mg/dL    Direct Measure HDL  Female: >= 50 mg/dL   Male: >= 40 mg/dL    LDL Cholesterol  Desirable: < 100 mg/dL  Above Desirable: 100 - 129 mg/dL   Borderline High: 130 - 159 mg/dL   High:  160 - 189 mg/dL   Very High: >= 190 mg/dL    Non HDL Cholesterol  Desirable: < 130 mg/dL  Above Desirable: 130 - 159 mg/dL  Borderline High: 160 - 189 mg/dL  High: 190 - 219 mg/dL  Very High: >= 220 mg/dL   Hemoglobin A1c     Status: Abnormal   Result Value Ref Range    Estimated Average Glucose 143 (H) <117 mg/dL    Hemoglobin A1C 6.6 (H) <5.7 %   TSH     Status: Normal   Result Value Ref Range    TSH 3.92 0.30 - 4.20 uIU/mL             Answers submitted by the patient for this visit:  Lipid Visit (Submitted on 1/8/2025)  Chief Complaint: Chronic problems general questions HPI Form  Are you regularly taking any medication or supplement to lower your cholesterol?: Yes  Are you having muscle aches or other side effects that you think could be caused by your cholesterol lowering medication?: Yes  Diabetes Visit (Submitted on 1/8/2025)  Chief Complaint: Chronic problems general questions HPI Form  Frequency of checking blood sugars:: not at all  Diabetic concerns:: other  Paraesthesia present:: none of these symptoms  General Questionnaire (Submitted on 1/8/2025)  Chief Complaint: Chronic problems general questions HPI Form  How many servings of fruits and vegetables do you eat daily?: 2-3  On average, how many sweetened beverages do you drink each  day (Examples: soda, juice, sweet tea, etc.  Do NOT count diet or artificially sweetened beverages)?: 0  How many minutes a day do you exercise enough to make your heart beat faster?: 10 to 19  How many days a week do you exercise enough to make your heart beat faster?: 4  How many days per week do you miss taking your medication?: 1  What makes it hard for you to take your medication every day?: remembering to take  Questionnaire about: Chronic problems general questions HPI Form (Submitted on 1/8/2025)  Chief Complaint: Chronic problems general questions HPI Form

## 2025-01-13 ENCOUNTER — OFFICE VISIT (OUTPATIENT)
Dept: FAMILY MEDICINE | Facility: OTHER | Age: 65
End: 2025-01-13
Attending: FAMILY MEDICINE
Payer: COMMERCIAL

## 2025-01-13 ENCOUNTER — LAB (OUTPATIENT)
Dept: LAB | Facility: OTHER | Age: 65
End: 2025-01-13
Attending: FAMILY MEDICINE
Payer: COMMERCIAL

## 2025-01-13 VITALS
RESPIRATION RATE: 16 BRPM | SYSTOLIC BLOOD PRESSURE: 124 MMHG | HEART RATE: 81 BPM | DIASTOLIC BLOOD PRESSURE: 80 MMHG | HEIGHT: 66 IN | TEMPERATURE: 97.6 F | BODY MASS INDEX: 31.18 KG/M2 | OXYGEN SATURATION: 96 % | WEIGHT: 194 LBS

## 2025-01-13 DIAGNOSIS — E66.811 CLASS 1 OBESITY WITH SERIOUS COMORBIDITY AND BODY MASS INDEX (BMI) OF 31.0 TO 31.9 IN ADULT, UNSPECIFIED OBESITY TYPE: ICD-10-CM

## 2025-01-13 DIAGNOSIS — R35.0 URINARY FREQUENCY: ICD-10-CM

## 2025-01-13 DIAGNOSIS — E11.9 TYPE 2 DIABETES MELLITUS WITHOUT COMPLICATION, WITHOUT LONG-TERM CURRENT USE OF INSULIN (H): Primary | ICD-10-CM

## 2025-01-13 DIAGNOSIS — R23.2 HOT FLASHES: ICD-10-CM

## 2025-01-13 DIAGNOSIS — Z85.3 HX: BREAST CANCER: ICD-10-CM

## 2025-01-13 DIAGNOSIS — Z98.890 HISTORY OF BACK SURGERY: ICD-10-CM

## 2025-01-13 DIAGNOSIS — E11.9 TYPE 2 DIABETES MELLITUS WITHOUT COMPLICATION, WITHOUT LONG-TERM CURRENT USE OF INSULIN (H): ICD-10-CM

## 2025-01-13 LAB
ALBUMIN SERPL BCG-MCNC: 4.3 G/DL (ref 3.5–5.2)
ALP SERPL-CCNC: 103 U/L (ref 40–150)
ALT SERPL W P-5'-P-CCNC: 24 U/L (ref 0–50)
ANION GAP SERPL CALCULATED.3IONS-SCNC: 12 MMOL/L (ref 7–15)
AST SERPL W P-5'-P-CCNC: 21 U/L (ref 0–45)
BILIRUB SERPL-MCNC: 0.5 MG/DL
BUN SERPL-MCNC: 15.7 MG/DL (ref 8–23)
CALCIUM SERPL-MCNC: 9.2 MG/DL (ref 8.8–10.4)
CHLORIDE SERPL-SCNC: 101 MMOL/L (ref 98–107)
CHOLEST SERPL-MCNC: 162 MG/DL
CREAT SERPL-MCNC: 0.96 MG/DL (ref 0.51–0.95)
CREAT UR-MCNC: 53.9 MG/DL
EGFRCR SERPLBLD CKD-EPI 2021: 66 ML/MIN/1.73M2
ERYTHROCYTE [DISTWIDTH] IN BLOOD BY AUTOMATED COUNT: 13.4 % (ref 10–15)
EST. AVERAGE GLUCOSE BLD GHB EST-MCNC: 143 MG/DL
FASTING STATUS PATIENT QL REPORTED: ABNORMAL
FASTING STATUS PATIENT QL REPORTED: NORMAL
GLUCOSE SERPL-MCNC: 118 MG/DL (ref 70–99)
HBA1C MFR BLD: 6.6 %
HCO3 SERPL-SCNC: 26 MMOL/L (ref 22–29)
HCT VFR BLD AUTO: 40.3 % (ref 35–47)
HDLC SERPL-MCNC: 59 MG/DL
HGB BLD-MCNC: 13.1 G/DL (ref 11.7–15.7)
HGB BLD-MCNC: 13.3 G/DL (ref 11.7–15.7)
LDLC SERPL CALC-MCNC: 89 MG/DL
MCH RBC QN AUTO: 28.2 PG (ref 26.5–33)
MCHC RBC AUTO-ENTMCNC: 32.5 G/DL (ref 31.5–36.5)
MCV RBC AUTO: 87 FL (ref 78–100)
MICROALBUMIN UR-MCNC: <12 MG/L
MICROALBUMIN/CREAT UR: NORMAL MG/G{CREAT}
NONHDLC SERPL-MCNC: 103 MG/DL
PLATELET # BLD AUTO: 255 10E3/UL (ref 150–450)
POTASSIUM SERPL-SCNC: 4.4 MMOL/L (ref 3.4–5.3)
PROT SERPL-MCNC: 7.1 G/DL (ref 6.4–8.3)
RBC # BLD AUTO: 4.65 10E6/UL (ref 3.8–5.2)
SODIUM SERPL-SCNC: 139 MMOL/L (ref 135–145)
TRIGL SERPL-MCNC: 71 MG/DL
TSH SERPL DL<=0.005 MIU/L-ACNC: 3.92 UIU/ML (ref 0.3–4.2)
WBC # BLD AUTO: 6.1 10E3/UL (ref 4–11)

## 2025-01-13 PROCEDURE — 85014 HEMATOCRIT: CPT | Mod: ZL

## 2025-01-13 PROCEDURE — 82570 ASSAY OF URINE CREATININE: CPT | Mod: ZL

## 2025-01-13 PROCEDURE — 36415 COLL VENOUS BLD VENIPUNCTURE: CPT | Mod: ZL

## 2025-01-13 PROCEDURE — 80061 LIPID PANEL: CPT | Mod: ZL

## 2025-01-13 PROCEDURE — 84450 TRANSFERASE (AST) (SGOT): CPT | Mod: ZL

## 2025-01-13 PROCEDURE — 83036 HEMOGLOBIN GLYCOSYLATED A1C: CPT | Mod: ZL

## 2025-01-13 PROCEDURE — 84155 ASSAY OF PROTEIN SERUM: CPT | Mod: ZL

## 2025-01-13 PROCEDURE — 99214 OFFICE O/P EST MOD 30 MIN: CPT | Performed by: FAMILY MEDICINE

## 2025-01-13 PROCEDURE — 84443 ASSAY THYROID STIM HORMONE: CPT | Mod: ZL

## 2025-01-13 PROCEDURE — 85018 HEMOGLOBIN: CPT | Mod: ZL

## 2025-01-13 PROCEDURE — 82043 UR ALBUMIN QUANTITATIVE: CPT | Mod: ZL

## 2025-01-13 PROCEDURE — G2211 COMPLEX E/M VISIT ADD ON: HCPCS | Performed by: FAMILY MEDICINE

## 2025-01-13 RX ORDER — METFORMIN HYDROCHLORIDE 500 MG/1
500 TABLET, EXTENDED RELEASE ORAL
Qty: 90 TABLET | Refills: 3 | Status: SHIPPED | OUTPATIENT
Start: 2025-01-13

## 2025-01-13 RX ORDER — ROSUVASTATIN CALCIUM 10 MG/1
5 TABLET, COATED ORAL DAILY
Qty: 45 TABLET | Refills: 4 | Status: SHIPPED | OUTPATIENT
Start: 2025-01-13

## 2025-01-13 RX ORDER — AMITRIPTYLINE HYDROCHLORIDE 10 MG/1
TABLET ORAL
Qty: 90 TABLET | Refills: 4 | Status: SHIPPED | OUTPATIENT
Start: 2025-01-13

## 2025-01-13 ASSESSMENT — PAIN SCALES - GENERAL: PAINLEVEL_OUTOF10: MILD PAIN (3)

## 2025-01-13 NOTE — NURSING NOTE
"Chief Complaint   Patient presents with    Diabetes     6 month DM check, medications       Initial /80 (BP Location: Right arm, Patient Position: Sitting, Cuff Size: Adult Regular)   Pulse 81   Temp 97.6  F (36.4  C) (Temporal)   Resp 16   Ht 1.676 m (5' 6\")   Wt 88 kg (194 lb)   LMP 08/31/2000 (Approximate)   SpO2 96%   Breastfeeding No   BMI 31.31 kg/m   Estimated body mass index is 31.31 kg/m  as calculated from the following:    Height as of this encounter: 1.676 m (5' 6\").    Weight as of this encounter: 88 kg (194 lb).    Medication Review: complete    Previous A1C is at goal of <8  Lab Results   Component Value Date    A1C 6.6 01/13/2025    A1C 6.5 07/10/2024    A1C 7.2 12/12/2023    A1C 7.0 07/28/2023    A1C 7.6 02/14/2023     Urine microalbumin:creatine: due 1/13/25  Foot exam :  7/10/24  Eye exam :   11/7/24  Tobacco User :  no  Patient is not on a daily aspirin  Patient is on a Statin.  Blood pressure today of:     BP Readings from Last 1 Encounters:   01/13/25 124/80      is at the goal of <139/89 for diabetics.    Suzette Kan LPN on 1/13/2025 at 8:50 AM        The next two questions are to help us understand your food security.  If you are feeling you need any assistance in this area, we have resources available to support you today.          2/28/2024   SDOH- Food Insecurity   Within the past 12 months, did you worry that your food would run out before you got money to buy more? N   Within the past 12 months, did the food you bought just not last and you didn t have money to get more? N       Health Care Directive:  Patient does not have a Health Care Directive: Patient states has Advance Directive and will bring in a copy to clinic.    Suzette Kan LPN      "

## 2025-01-28 ENCOUNTER — MYC MEDICAL ADVICE (OUTPATIENT)
Dept: FAMILY MEDICINE | Facility: OTHER | Age: 65
End: 2025-01-28
Payer: COMMERCIAL

## 2025-01-28 DIAGNOSIS — E11.9 TYPE 2 DIABETES MELLITUS WITHOUT COMPLICATION, WITHOUT LONG-TERM CURRENT USE OF INSULIN (H): ICD-10-CM

## 2025-01-28 NOTE — TELEPHONE ENCOUNTER
Wants Rx for Ozempic sent to [facility] mail order instead of [facility] due to cost.     Silvio'd up order.     Routing to provider to review and respond.  Janeth Dan RN on 1/28/2025 at 11:39 AM

## 2025-03-31 ENCOUNTER — MYC MEDICAL ADVICE (OUTPATIENT)
Dept: FAMILY MEDICINE | Facility: OTHER | Age: 65
End: 2025-03-31
Payer: COMMERCIAL

## 2025-03-31 DIAGNOSIS — E11.9 TYPE 2 DIABETES MELLITUS WITHOUT COMPLICATION, WITHOUT LONG-TERM CURRENT USE OF INSULIN (H): ICD-10-CM

## 2025-04-15 ENCOUNTER — MYC MEDICAL ADVICE (OUTPATIENT)
Dept: FAMILY MEDICINE | Facility: OTHER | Age: 65
End: 2025-04-15
Payer: COMMERCIAL

## 2025-04-15 DIAGNOSIS — T75.3XXA MOTION SICKNESS, INITIAL ENCOUNTER: Primary | ICD-10-CM

## 2025-04-15 RX ORDER — SCOPOLAMINE 1 MG/3D
1 PATCH, EXTENDED RELEASE TRANSDERMAL
Qty: 4 PATCH | Refills: 0 | Status: SHIPPED | OUTPATIENT
Start: 2025-04-15

## 2025-04-15 NOTE — TELEPHONE ENCOUNTER
I opted to sign scopalamine order. Please assure the patient wash her hands well after applying and removing and avoid touching her eyes before washing.     Sincerely,   Sparkle Rich NP on 4/15/2025 at 3:30 PM

## 2025-04-15 NOTE — TELEPHONE ENCOUNTER
Pt requesting rx for Scopolamine patches for trip to [place].    Silvio'd up order for #4    Routing to provider to review and respond.  Janeth Dan RN on 4/15/2025 at 3:07 PM

## 2025-05-10 ENCOUNTER — HEALTH MAINTENANCE LETTER (OUTPATIENT)
Age: 65
End: 2025-05-10

## 2025-08-19 ENCOUNTER — OFFICE VISIT (OUTPATIENT)
Dept: FAMILY MEDICINE | Facility: OTHER | Age: 65
End: 2025-08-19
Attending: FAMILY MEDICINE
Payer: COMMERCIAL

## 2025-08-19 VITALS
HEIGHT: 66 IN | TEMPERATURE: 97.5 F | OXYGEN SATURATION: 98 % | BODY MASS INDEX: 31.63 KG/M2 | RESPIRATION RATE: 16 BRPM | DIASTOLIC BLOOD PRESSURE: 84 MMHG | SYSTOLIC BLOOD PRESSURE: 126 MMHG | HEART RATE: 73 BPM | WEIGHT: 196.8 LBS

## 2025-08-19 DIAGNOSIS — E11.9 TYPE 2 DIABETES MELLITUS WITHOUT COMPLICATION, WITHOUT LONG-TERM CURRENT USE OF INSULIN (H): ICD-10-CM

## 2025-08-19 DIAGNOSIS — Z23 NEED FOR VACCINATION FOR PNEUMOCOCCUS: ICD-10-CM

## 2025-08-19 DIAGNOSIS — E66.811 CLASS 1 OBESITY WITH SERIOUS COMORBIDITY AND BODY MASS INDEX (BMI) OF 31.0 TO 31.9 IN ADULT, UNSPECIFIED OBESITY TYPE: ICD-10-CM

## 2025-08-19 DIAGNOSIS — Z85.3 HX: BREAST CANCER: ICD-10-CM

## 2025-08-19 DIAGNOSIS — Z78.0 ASYMPTOMATIC MENOPAUSE: ICD-10-CM

## 2025-08-19 DIAGNOSIS — Z00.00 ENCOUNTER FOR MEDICARE ANNUAL WELLNESS EXAM: Primary | ICD-10-CM

## 2025-08-19 DIAGNOSIS — R94.6 ABNORMAL FINDING ON THYROID FUNCTION TEST: ICD-10-CM

## 2025-08-19 DIAGNOSIS — Z98.890 HISTORY OF BACK SURGERY: ICD-10-CM

## 2025-08-19 LAB
ALBUMIN SERPL BCG-MCNC: 4.4 G/DL (ref 3.5–5.2)
ALBUMIN UR-MCNC: NEGATIVE MG/DL
ALP SERPL-CCNC: 90 U/L (ref 40–150)
ALT SERPL W P-5'-P-CCNC: 23 U/L (ref 0–50)
ANION GAP SERPL CALCULATED.3IONS-SCNC: 11 MMOL/L (ref 7–15)
APPEARANCE UR: CLEAR
AST SERPL W P-5'-P-CCNC: 22 U/L (ref 0–45)
BILIRUB SERPL-MCNC: 0.8 MG/DL
BILIRUB UR QL STRIP: NEGATIVE
BUN SERPL-MCNC: 15.9 MG/DL (ref 8–23)
CALCIUM SERPL-MCNC: 9.6 MG/DL (ref 8.8–10.4)
CHLORIDE SERPL-SCNC: 100 MMOL/L (ref 98–107)
CHOLEST SERPL-MCNC: 169 MG/DL
COLOR UR AUTO: YELLOW
CREAT SERPL-MCNC: 0.87 MG/DL (ref 0.51–0.95)
CREAT UR-MCNC: 21.6 MG/DL
EGFRCR SERPLBLD CKD-EPI 2021: 74 ML/MIN/1.73M2
EST. AVERAGE GLUCOSE BLD GHB EST-MCNC: 143 MG/DL
FASTING STATUS PATIENT QL REPORTED: YES
FASTING STATUS PATIENT QL REPORTED: YES
GLUCOSE SERPL-MCNC: 117 MG/DL (ref 70–99)
GLUCOSE UR STRIP-MCNC: NEGATIVE MG/DL
HBA1C MFR BLD: 6.6 %
HCO3 SERPL-SCNC: 27 MMOL/L (ref 22–29)
HDLC SERPL-MCNC: 60 MG/DL
HGB UR QL STRIP: NEGATIVE
KETONES UR STRIP-MCNC: NEGATIVE MG/DL
LDLC SERPL CALC-MCNC: 91 MG/DL
LEUKOCYTE ESTERASE UR QL STRIP: NEGATIVE
MICROALBUMIN UR-MCNC: <12 MG/L
MICROALBUMIN/CREAT UR: NORMAL MG/G{CREAT}
NITRATE UR QL: NEGATIVE
NONHDLC SERPL-MCNC: 109 MG/DL
PH UR STRIP: 5.5 [PH] (ref 5–9)
POTASSIUM SERPL-SCNC: 4 MMOL/L (ref 3.4–5.3)
PROT SERPL-MCNC: 7.4 G/DL (ref 6.4–8.3)
SODIUM SERPL-SCNC: 138 MMOL/L (ref 135–145)
SP GR UR STRIP: 1 (ref 1–1.03)
T4 FREE SERPL-MCNC: 1.06 NG/DL (ref 0.9–1.7)
TRIGL SERPL-MCNC: 88 MG/DL
TSH SERPL DL<=0.005 MIU/L-ACNC: 4.33 UIU/ML (ref 0.3–4.2)
UROBILINOGEN UR STRIP-MCNC: NORMAL MG/DL
VIT B12 SERPL-MCNC: 548 PG/ML (ref 232–1245)

## 2025-08-19 PROCEDURE — 36415 COLL VENOUS BLD VENIPUNCTURE: CPT | Mod: ZL | Performed by: FAMILY MEDICINE

## 2025-08-19 PROCEDURE — 84443 ASSAY THYROID STIM HORMONE: CPT | Mod: ZL | Performed by: FAMILY MEDICINE

## 2025-08-19 PROCEDURE — 82043 UR ALBUMIN QUANTITATIVE: CPT | Mod: ZL | Performed by: FAMILY MEDICINE

## 2025-08-19 PROCEDURE — 84439 ASSAY OF FREE THYROXINE: CPT | Mod: ZL | Performed by: FAMILY MEDICINE

## 2025-08-19 PROCEDURE — 81003 URINALYSIS AUTO W/O SCOPE: CPT | Mod: ZL | Performed by: FAMILY MEDICINE

## 2025-08-19 PROCEDURE — 80053 COMPREHEN METABOLIC PANEL: CPT | Mod: ZL | Performed by: FAMILY MEDICINE

## 2025-08-19 PROCEDURE — 83036 HEMOGLOBIN GLYCOSYLATED A1C: CPT | Mod: ZL | Performed by: FAMILY MEDICINE

## 2025-08-19 PROCEDURE — 82607 VITAMIN B-12: CPT | Mod: ZL | Performed by: FAMILY MEDICINE

## 2025-08-19 PROCEDURE — 84478 ASSAY OF TRIGLYCERIDES: CPT | Mod: ZL | Performed by: FAMILY MEDICINE

## 2025-08-19 RX ORDER — METFORMIN HYDROCHLORIDE 500 MG/1
500 TABLET, EXTENDED RELEASE ORAL
Qty: 90 TABLET | Refills: 3 | Status: SHIPPED | OUTPATIENT
Start: 2025-08-19

## 2025-08-19 RX ORDER — AMITRIPTYLINE HYDROCHLORIDE 10 MG/1
TABLET ORAL
Qty: 90 TABLET | Refills: 4 | Status: SHIPPED | OUTPATIENT
Start: 2025-08-19

## 2025-08-19 RX ORDER — ROSUVASTATIN CALCIUM 10 MG/1
5 TABLET, COATED ORAL DAILY
Qty: 45 TABLET | Refills: 4 | Status: SHIPPED | OUTPATIENT
Start: 2025-08-19

## 2025-08-19 SDOH — HEALTH STABILITY: PHYSICAL HEALTH: ON AVERAGE, HOW MANY DAYS PER WEEK DO YOU ENGAGE IN MODERATE TO STRENUOUS EXERCISE (LIKE A BRISK WALK)?: 2 DAYS

## 2025-08-19 SDOH — HEALTH STABILITY: PHYSICAL HEALTH: ON AVERAGE, HOW MANY MINUTES DO YOU ENGAGE IN EXERCISE AT THIS LEVEL?: 20 MIN

## 2025-08-19 ASSESSMENT — SOCIAL DETERMINANTS OF HEALTH (SDOH)
HOW OFTEN DO YOU GET TOGETHER WITH FRIENDS OR RELATIVES?: TWICE A WEEK
HOW OFTEN DO YOU GET TOGETHER WITH FRIENDS OR RELATIVES?: TWICE A WEEK

## 2025-08-19 ASSESSMENT — PAIN SCALES - GENERAL: PAINLEVEL_OUTOF10: NO PAIN (0)

## (undated) RX ORDER — ACETAMINOPHEN 500 MG
TABLET ORAL
Status: DISPENSED
Start: 2023-08-16

## (undated) RX ORDER — LEVOFLOXACIN 750 MG/1
TABLET, FILM COATED ORAL
Status: DISPENSED
Start: 2023-08-17

## (undated) RX ORDER — LEVOFLOXACIN 5 MG/ML
INJECTION, SOLUTION INTRAVENOUS
Status: DISPENSED
Start: 2023-08-17